# Patient Record
Sex: MALE | Race: WHITE | NOT HISPANIC OR LATINO | Employment: FULL TIME | URBAN - METROPOLITAN AREA
[De-identification: names, ages, dates, MRNs, and addresses within clinical notes are randomized per-mention and may not be internally consistent; named-entity substitution may affect disease eponyms.]

---

## 2020-10-01 ENCOUNTER — TELEPHONE (OUTPATIENT)
Dept: ENDOCRINOLOGY | Facility: CLINIC | Age: 50
End: 2020-10-01

## 2020-10-01 ENCOUNTER — LAB (OUTPATIENT)
Dept: LAB | Facility: HOSPITAL | Age: 50
End: 2020-10-01

## 2020-10-01 ENCOUNTER — OFFICE VISIT (OUTPATIENT)
Dept: ENDOCRINOLOGY | Facility: CLINIC | Age: 50
End: 2020-10-01

## 2020-10-01 VITALS
WEIGHT: 260.8 LBS | BODY MASS INDEX: 35.33 KG/M2 | OXYGEN SATURATION: 98 % | HEART RATE: 90 BPM | HEIGHT: 72 IN | SYSTOLIC BLOOD PRESSURE: 144 MMHG | DIASTOLIC BLOOD PRESSURE: 76 MMHG

## 2020-10-01 DIAGNOSIS — E26.9 HIGH ALDOSTERONE (HCC): ICD-10-CM

## 2020-10-01 DIAGNOSIS — R94.6 ABNORMAL THYROID FUNCTION TEST: Primary | ICD-10-CM

## 2020-10-01 DIAGNOSIS — R79.89 LOW TESTOSTERONE: ICD-10-CM

## 2020-10-01 LAB
T4 SERPL-MCNC: 5.72 MCG/DL (ref 4.5–11.7)
TSH SERPL DL<=0.05 MIU/L-ACNC: 0.78 UIU/ML (ref 0.27–4.2)

## 2020-10-01 PROCEDURE — 84436 ASSAY OF TOTAL THYROXINE: CPT | Performed by: INTERNAL MEDICINE

## 2020-10-01 PROCEDURE — 99204 OFFICE O/P NEW MOD 45 MIN: CPT | Performed by: INTERNAL MEDICINE

## 2020-10-01 PROCEDURE — 84443 ASSAY THYROID STIM HORMONE: CPT | Performed by: INTERNAL MEDICINE

## 2020-10-01 RX ORDER — METOPROLOL SUCCINATE AND HYDROCHLOROTHIAZIDE 12.5; 5 MG/1; MG/1
1 TABLET ORAL DAILY
COMMUNITY
End: 2021-09-15

## 2020-10-01 RX ORDER — TESTOSTERONE CYPIONATE 200 MG/ML
INJECTION, SOLUTION INTRAMUSCULAR
COMMUNITY
Start: 2020-09-12 | End: 2022-05-06

## 2020-10-01 RX ORDER — PRAMIPEXOLE DIHYDROCHLORIDE 0.25 MG/1
0.25 TABLET ORAL 3 TIMES DAILY
COMMUNITY
End: 2022-11-07

## 2020-10-01 RX ORDER — LAMOTRIGINE 150 MG/1
150 TABLET ORAL DAILY
COMMUNITY
End: 2022-07-26

## 2020-10-01 RX ORDER — CAFFEINE 200 MG
200 TABLET ORAL DAILY
COMMUNITY
End: 2021-01-05

## 2020-10-01 RX ORDER — EPLERENONE 25 MG/1
25 TABLET, FILM COATED ORAL DAILY
COMMUNITY
End: 2021-09-10

## 2020-10-01 RX ORDER — POTASSIUM CHLORIDE 1500 MG/1
20 TABLET, FILM COATED, EXTENDED RELEASE ORAL 2 TIMES DAILY
COMMUNITY
End: 2021-09-09 | Stop reason: SDUPTHER

## 2020-10-01 RX ORDER — VIT C/B6/B5/MAGNESIUM/HERB 173 50-5-6-5MG
1 CAPSULE ORAL DAILY
COMMUNITY

## 2020-10-01 RX ORDER — UBIDECARENONE 100 MG
100 CAPSULE ORAL DAILY
COMMUNITY

## 2020-10-01 RX ORDER — AMLODIPINE BESYLATE 10 MG/1
10 TABLET ORAL DAILY
COMMUNITY
End: 2022-11-07

## 2020-10-01 NOTE — TELEPHONE ENCOUNTER
----- Message from Alanna Lopez MD sent at 10/1/2020  9:20 AM EDT -----  Could you please request patient's prior records from [facility], specifically any recent notes/labs (renin/aldosterone levels)?    Thank you

## 2020-10-01 NOTE — PROGRESS NOTES
Chief Complaint   Patient presents with   • Establish Care   • Hypothyroidism        New patient who is being seen in consultation regarding hypothyroidism at the request of Walter Arrington MD     HPI   Damon Blum is a 50 y.o. male who presents for evaluation of hypothyroidism.    Patient presents today for evaluation of hypothyroidism which was diagnosed in June 2020 on routine labs after patient presented to PCP for evaluation of fatigue.  He denies any known history of thyroid dysfunction but is unsure if this is been checked in the past.  He is not currently taking any medication related to thyroid function.     Patient denies palpiltations, anxiety.  Patient denies changes in bowel habits.   Patient denies heat or cold intolerance.  Patient denies recent changes in weight.  Patient does report difficulty losing weight over his adult life but states that weight has been stable over the past 6 months.  Patient denies hair, skin or nail changes.  Patient denies tremor.  Patient reports decreased energy level.    Patient denies history of previous head/neck radiation.  Patient denies history of recent iodine exposure.  Patient denies taking OTC supplements such as biotin.  Patient denies personal or family history of thyroid cancer.  Patient does report that his son was recently diagnosed with hypothyroidism.     Patient also reports desire to discuss potential adrenal abnormality.  Patient reports he has been told in the past that his aldosterone level was elevated and he is currently followed by nephrology for this.  He denies any prior adrenal imaging.  He reports that aldosterone was checked secondary to difficult to control hypertension.  Patient reports that his blood pressure nicol progressively over the past 1 to 2 years.  Blood pressure currently well controlled but requiring multiple agents.  He reports that he was most recently started on eplerenone.  He reports that he tried Aldactone  initially but he developed breast tenderness on this medication.    Patient also reports that he was diagnosed with low testosterone during his evaluation for fatigue and has been started on testosterone 200 mg IM every 2 weeks by his PCP.  He reports symptomatic improvement since starting this.    Past Medical History:   Diagnosis Date   • Arthritis    • Depression    • Hypertension    • Kidney stones      Past Surgical History:   Procedure Laterality Date   • HIP SURGERY Right    • SHOULDER SURGERY Right       Family History   Problem Relation Age of Onset   • Cancer Mother    • Mental illness Mother    • Arthritis Father    • Hypertension Father    • Mental illness Brother    • Arthritis Maternal Grandmother    • Hypertension Maternal Grandmother    • Mental illness Maternal Grandfather    • Arthritis Paternal Grandmother    • Hypertension Paternal Grandmother       Social History     Socioeconomic History   • Marital status:      Spouse name: Not on file   • Number of children: Not on file   • Years of education: Not on file   • Highest education level: Not on file   Tobacco Use   • Smoking status: Never Smoker   • Smokeless tobacco: Never Used   Substance and Sexual Activity   • Alcohol use: Yes     Comment: occa   • Drug use: Never   • Sexual activity: Defer      No Known Allergies   Current Outpatient Medications on File Prior to Visit   Medication Sig Dispense Refill   • amLODIPine (NORVASC) 10 MG tablet Take 10 mg by mouth Daily.     • caffeine 200 MG tablet Take 200 mg by mouth Daily.     • coenzyme Q10 100 MG capsule Take 100 mg by mouth Daily.     • eplerenone (INSPRA) 25 MG tablet Take 25 mg by mouth Daily.     • GLUTAMINE PO Take 1 capsule by mouth Daily.     • lamoTRIgine (LaMICtal) 150 MG tablet Take 150 mg by mouth Daily.     • Metoprolol-HCTZ ER 50-12.5 MG tablet sustained-release 24 hour Take 1 tablet by mouth Daily.     • potassium chloride ER (K-TAB) 20 MEQ tablet controlled-release ER  "tablet Take 20 mEq by mouth 2 (two) times a day.     • pramipexole (Mirapex) 0.25 MG tablet Take 0.25 mg by mouth 3 (Three) Times a Day. Takes 1 tab and 1/2 daily.     • TADALAFIL PO Take 1 tablet by mouth Daily.     • Turmeric 500 MG capsule Take 1 capsule by mouth Daily.     • valsartan 80 MG tablet 320 mg, hydroCHLOROthiazide 25 MG tablet 25 mg Take 1 dose by mouth Daily.     • Testosterone Cypionate (DEPOTESTOTERONE CYPIONATE) 200 MG/ML injection INJECT 1 ML EVERY 2 WEEKS       No current facility-administered medications on file prior to visit.         Review of Systems   Constitutional: Positive for fatigue. Negative for unexpected weight gain and unexpected weight loss.   HENT: Negative for trouble swallowing and voice change.    Eyes: Negative for pain and visual disturbance.   Respiratory: Negative for cough and shortness of breath.    Cardiovascular: Negative for chest pain and palpitations.   Gastrointestinal: Negative for constipation and diarrhea.   Endocrine: Negative for cold intolerance and heat intolerance.   Musculoskeletal: Negative for arthralgias and myalgias.   Skin: Negative for dry skin and rash.   Neurological: Negative for tremors and headache.   Psychiatric/Behavioral: Positive for depressed mood. The patient is nervous/anxious.       Vitals:    10/01/20 0809   BP: 144/76   Pulse: 90   SpO2: 98%   Weight: 118 kg (260 lb 12.8 oz)   Height: 182.9 cm (72\")   Body mass index is 35.37 kg/m².     Physical Exam  Vitals signs reviewed.   Constitutional:       General: He is not in acute distress.     Appearance: Normal appearance.   HENT:      Head: Normocephalic.      Right Ear: Hearing normal.      Left Ear: Hearing normal.      Nose: Nose normal.   Eyes:      General: Lids are normal.      Conjunctiva/sclera: Conjunctivae normal.   Neck:      Thyroid: No thyromegaly or thyroid tenderness.   Cardiovascular:      Rate and Rhythm: Normal rate and regular rhythm.      Heart sounds: No murmur. "   Pulmonary:      Effort: Pulmonary effort is normal.      Breath sounds: Normal breath sounds and air entry.   Abdominal:      General: Bowel sounds are normal.      Palpations: Abdomen is soft.      Tenderness: There is no abdominal tenderness.   Lymphadenopathy:      Head:      Right side of head: No submandibular adenopathy.      Left side of head: No submandibular adenopathy.      Cervical: No cervical adenopathy.   Skin:     General: Skin is warm and dry.      Findings: No rash.   Neurological:      Mental Status: He is alert.      Coordination: Coordination is intact.      Deep Tendon Reflexes: Reflexes are normal and symmetric.   Psychiatric:         Mood and Affect: Mood and affect normal.         Behavior: Behavior is cooperative.        Labs/Imaging  Labs dated 6/24/2020  TSH 5.46    Assessment and Plan    Damon was seen today for establish care and hypothyroidism.    Diagnoses and all orders for this visit:    Abnormal thyroid function test  -Patient with elevated TSH on screening labs in June 2020  -Discussed that abnormality is mild and patient likely has subclinical hypothyroidism, discussed indications for treatment of subclinical hypothyroidism  -We will plan to repeat labs in office today to determine next steps  -Discussed potential treatment with levothyroxine, discussed appropriate administration of medication and side effects  - reviewed symptoms of both hypothyroidism and hyperthyroidism in detail, patient instructed to contact the office between visits with any concerning changes.  -     TSH  -     T4    High aldosterone (CMS/[facility])  -Per patient report, previous records not available  -Request prior records from nephrology and review prior to determining next steps    Low testosterone  -Taking testosterone 200 mg IM every 2 weeks per PCP    Addendum dated 10/9/2020  Received prior nephrology notes which reports at one point patient had aldosterone level of 69 with a concurrent suppressed  renin.  CT of adrenal glands in April 2019 which reported no adenoma or hyperplasia.  Further context is not available. Will request records from PCP.    Addendum dated 10/16   CT abdomen pelvis with contrast dated 9/29/2009  Adrenals are normal.    Labs dated 2/19/2019  Serum aldosterone 60.8, reference range 0-30  Potassium 3.3    Labs dated 2/19/2019  Serum aldosterone 69.9 reference range 0-30  Plasma renin activity less than 0.167  Serum potassium 3.3    Reviewed prior outside labs with patient  Patient desires repeat evaluation for hyperaldosteronism. Discussed this would require stopping eplerenone for several weeks, reviewed risks of worsening hypertension, hypokalemia while off this medication. Patient voiced understanding. Current BP for past week 145 systolic, patient to stop eplerenone. Check BP regularly and call with any concerning changes. BMP in 1-2 weeks to access potassium levels. Repeat renin/daljit after 6 weeks off eplerenone.    Return in about 3 months (around 1/1/2021). The patient was instructed to contact the clinic with any interval questions or concerns.    Alanna Lopez MD     Please note that portions of this document were completed using a voice recognition program. Efforts were made to edit the dictations, but occasionally words are mis-transcribed.

## 2020-10-09 NOTE — TELEPHONE ENCOUNTER
Records reviewed.     Could we request records of any prior aldosterone/renin testing as well as abdominal CT imaging, if available from PCP.

## 2020-10-12 NOTE — TELEPHONE ENCOUNTER
Requested:  Aldosterone/Renin Testing, Abdominal CT Imaging if available.  Fax:  [phone], [facility]:  [phone]

## 2020-10-26 ENCOUNTER — TELEPHONE (OUTPATIENT)
Dept: ENDOCRINOLOGY | Facility: CLINIC | Age: 50
End: 2020-10-26

## 2020-10-26 NOTE — TELEPHONE ENCOUNTER
Patient would like to know if  received and reviewed his previous records for his Adrenal glands.     Dr. Lopez, looks like we received the patient's previous records and I forward them to you.  Please advise.  Thank you,

## 2020-10-26 NOTE — TELEPHONE ENCOUNTER
PT is calling in regards to the Adrenal Gland Study that  conducted on 10/01/2020 He would like for someone to reach out to him in regards to that

## 2020-11-11 ENCOUNTER — TELEPHONE (OUTPATIENT)
Dept: ENDOCRINOLOGY | Facility: CLINIC | Age: 50
End: 2020-11-11

## 2020-11-11 NOTE — TELEPHONE ENCOUNTER
Patient is wanting to know if his blood pressure medication: valsartan will affect his lab results. Please give pt a call.

## 2020-11-11 NOTE — TELEPHONE ENCOUNTER
Patient needs to know if he should come off his HTN meds before having his BMP lab test drawn?  Patient has been off Eplerenone 25 mg for two days now, would like to know if he needs to be off the Valsartan 80 mg as well?    Dr. Lopez, please advise.  Thank you.

## 2020-11-13 ENCOUNTER — TELEPHONE (OUTPATIENT)
Dept: ENDOCRINOLOGY | Facility: CLINIC | Age: 50
End: 2020-11-13

## 2020-11-13 NOTE — TELEPHONE ENCOUNTER
PT CALLED ASKING IF TAKING A FOLIC ACID SUPPLEMENT WOULD INTERFERE W/ RECENT LAB ORDERS WE SENT. PLEASE CALL PT BACK AT CONVENIENCE.

## 2020-12-21 ENCOUNTER — LAB (OUTPATIENT)
Dept: LAB | Facility: HOSPITAL | Age: 50
End: 2020-12-21

## 2020-12-21 ENCOUNTER — LAB (OUTPATIENT)
Dept: ENDOCRINOLOGY | Facility: CLINIC | Age: 50
End: 2020-12-21

## 2020-12-21 DIAGNOSIS — E26.9 HIGH ALDOSTERONE (HCC): ICD-10-CM

## 2020-12-21 LAB
ANION GAP SERPL CALCULATED.3IONS-SCNC: 9.9 MMOL/L (ref 5–15)
BUN SERPL-MCNC: 19 MG/DL (ref 6–20)
BUN/CREAT SERPL: 16.5 (ref 7–25)
CALCIUM SPEC-SCNC: 8.9 MG/DL (ref 8.6–10.5)
CHLORIDE SERPL-SCNC: 107 MMOL/L (ref 98–107)
CO2 SERPL-SCNC: 26.1 MMOL/L (ref 22–29)
CREAT SERPL-MCNC: 1.15 MG/DL (ref 0.76–1.27)
GFR SERPL CREATININE-BSD FRML MDRD: 67 ML/MIN/1.73
GLUCOSE SERPL-MCNC: 89 MG/DL (ref 65–99)
POTASSIUM SERPL-SCNC: 3.4 MMOL/L (ref 3.5–5.2)
SODIUM SERPL-SCNC: 143 MMOL/L (ref 136–145)

## 2020-12-21 PROCEDURE — 80048 BASIC METABOLIC PNL TOTAL CA: CPT

## 2020-12-24 ENCOUNTER — TELEPHONE (OUTPATIENT)
Dept: ENDOCRINOLOGY | Facility: CLINIC | Age: 50
End: 2020-12-24

## 2020-12-24 NOTE — TELEPHONE ENCOUNTER
----- Message from Alanna Lopez MD sent at 12/24/2020 10:23 AM EST -----  Please contact patient and verify his current potassium supplementation. Repeat labs showed slightly low level and we may need to increase. Will provide further instructions once current dose known.

## 2020-12-24 NOTE — TELEPHONE ENCOUNTER
Informed patient of lab results.  Patient states he is taking potassium supplements, 2 meq tabs in the morning and 2 meq tabs at night.

## 2020-12-24 NOTE — TELEPHONE ENCOUNTER
Spoke to patient, he is taking 2 20 mcg tabs daily (total of 80 meq daily). Asked him to increase my 1 tab (20 meq) given low K. Patient voiced understanding.

## 2021-01-05 ENCOUNTER — TELEMEDICINE (OUTPATIENT)
Dept: ENDOCRINOLOGY | Facility: CLINIC | Age: 51
End: 2021-01-05

## 2021-01-05 VITALS — SYSTOLIC BLOOD PRESSURE: 146 MMHG | DIASTOLIC BLOOD PRESSURE: 90 MMHG | WEIGHT: 234 LBS | BODY MASS INDEX: 31.74 KG/M2

## 2021-01-05 DIAGNOSIS — E87.6 HYPOKALEMIA: ICD-10-CM

## 2021-01-05 DIAGNOSIS — E29.1 HYPOGONADISM IN MALE: ICD-10-CM

## 2021-01-05 DIAGNOSIS — R94.6 ABNORMAL THYROID FUNCTION TEST: ICD-10-CM

## 2021-01-05 DIAGNOSIS — E26.9 HIGH ALDOSTERONE (HCC): Primary | ICD-10-CM

## 2021-01-05 PROCEDURE — 99214 OFFICE O/P EST MOD 30 MIN: CPT | Performed by: INTERNAL MEDICINE

## 2021-01-05 NOTE — PROGRESS NOTES
Chief Complaint   Patient presents with   • Follow-up   • Abnormal Lab     high aldosterone   • Hypothyroidism        HPI   Lisseth Burrell is a 50 y.o. male had concerns including Follow-up, Abnormal Lab (high aldosterone), and Hypothyroidism.      This was an audio and video enabled telemedicine encounter. A total of 18 minutes was spent in direct contact with the patient.    Patient reports doing generally well in the interim since his last visit.  He has now been off eplerenone for approximately 8 weeks.  He is monitoring blood pressure periodically and reports, systolics generally 140s to 150s.  He denies any headaches or vision changes.  Potassium supplementation increased to total of 100 mEq daily given hypokalemia on recent labs.    Reviewed recent thyroid function testing, discussed no intervention required at this time.    Patient continues on testosterone per PCP.    The following portions of the patient's history were reviewed and updated as appropriate: allergies, current medications and past social history.    Review of Systems   Respiratory: Negative for cough and shortness of breath.    Cardiovascular: Negative for chest pain and palpitations.   Gastrointestinal: Negative for constipation and diarrhea.   Neurological: Negative for headache.          /90   Wt 106 kg (234 lb)   BMI 31.74 kg/m²      Physical Exam    General: well appearing, in no acute distress  Respiratory: no increased work of breathing  Neuro: alert, answers questions appropriately  Psych: appropriate mood and affect    Labs/Imaging  Results for LISSETH BURRELL (MRN [id number]) as of 1/5/2021 18:13   Ref. Range 10/1/2020 08:53 12/21/2020 08:31   Glucose Latest Ref Range: 65 - 99 mg/dL  89   Sodium Latest Ref Range: 136 - 145 mmol/L  143   Potassium Latest Ref Range: 3.5 - 5.2 mmol/L  3.4 (L)   CO2 Latest Ref Range: 22.0 - 29.0 mmol/L  26.1   Chloride Latest Ref Range: 98 - 107 mmol/L  107   Anion Gap Latest Ref  Range: 5.0 - 15.0 mmol/L  9.9   Creatinine Latest Ref Range: 0.76 - 1.27 mg/dL  1.15   BUN Latest Ref Range: 6 - 20 mg/dL  19   BUN/Creatinine Ratio Latest Ref Range: 7.0 - 25.0   16.5   Calcium Latest Ref Range: 8.6 - 10.5 mg/dL  8.9   eGFR Non  Am Latest Ref Range: >60 mL/min/1.73  67   TSH Baseline Latest Ref Range: 0.270 - 4.200 uIU/mL 0.783    T4, Total Latest Ref Range: 4.50 - 11.70 mcg/dL 5.72        Diagnoses and all orders for this visit:    1. High aldosterone (CMS/HCC) (Primary)  -Patient now off of eplerenone for approximately 8 weeks, will plan to repeat aldosterone renin ratio, renal function panel  -Discussed if ratio elevated will proceed with CT adrenal protocol  -     Aldosterone / Renin Ratio; Future  -     Renal Function Panel; Future    2. Hypokalemia  -Likely secondary to elevated aldosterone  -Currently taking potassium, total of 100 mEq daily  -Repeat BMP ordered    3. Abnormal thyroid function test  Patient with abnormal TSH on labs in June 2020, resolved on repeat testing  Patient clinically euthyroid  Plan to repeat TSH in 6 to 12 months, sooner if concerning symptoms develop    4. Hypogonadism in male  -Patient on testosterone from PCP      Return in about 3 months (around 4/5/2021). The patient was instructed to contact the clinic with any interval questions or concerns.    Alanna Lopez MD   Endocrinologist    Please note that portions of this document were completed with a voice recognition program. Efforts were made to edit the dictations, but occasionally words are mis-transcribed.

## 2021-02-04 ENCOUNTER — TELEPHONE (OUTPATIENT)
Dept: ENDOCRINOLOGY | Facility: CLINIC | Age: 51
End: 2021-02-04

## 2021-02-04 NOTE — TELEPHONE ENCOUNTER
Requested recent labs for patient's from PCP's office.  Fax request to [phone].    Walter Morgan.  Phone:  [phone]

## 2021-02-04 NOTE — TELEPHONE ENCOUNTER
Patient called and said he had the active labs done 3 weeks ago and wanted to know if we could reach out to his PCP to get those medical records since we don't have them yet. He would also like a call back from Dr. Lopez regarding the labs.

## 2021-02-08 NOTE — TELEPHONE ENCOUNTER
Please contact patient, aldosterone/renin ratio is elevated. I would recommend proceeding with repeat CT of the adrenals, please let me know if he is agreeable and I will order.

## 2021-02-11 DIAGNOSIS — D35.02 ADRENAL CORTICAL ADENOMA OF LEFT ADRENAL GLAND: ICD-10-CM

## 2021-02-11 DIAGNOSIS — E26.9 HIGH ALDOSTERONE (HCC): Primary | ICD-10-CM

## 2021-02-26 ENCOUNTER — HOSPITAL ENCOUNTER (OUTPATIENT)
Dept: CT IMAGING | Facility: HOSPITAL | Age: 51
Discharge: HOME | End: 2021-02-26
Admitting: INTERNAL MEDICINE

## 2021-02-26 DIAGNOSIS — E26.9 HIGH ALDOSTERONE (HCC): ICD-10-CM

## 2021-02-26 LAB — CREAT BLDA-MCNC: 1.4 MG/DL (ref 0.6–1.3)

## 2021-02-26 PROCEDURE — 0 IOPAMIDOL PER 1 ML: Performed by: INTERNAL MEDICINE

## 2021-02-26 PROCEDURE — 82565 ASSAY OF CREATININE: CPT

## 2021-02-26 PROCEDURE — 74170 CT ABD WO CNTRST FLWD CNTRST: CPT

## 2021-02-26 RX ADMIN — IOPAMIDOL 85 ML: 755 INJECTION, SOLUTION INTRAVENOUS at 16:17

## 2021-03-10 DIAGNOSIS — I10 ESSENTIAL HYPERTENSION, BENIGN: Primary | ICD-10-CM

## 2021-03-10 RX ORDER — PRAMIPEXOLE DIHYDROCHLORIDE 0.25 MG/1
0.25 TABLET ORAL 2 TIMES DAILY
Qty: 180 TABLET | Refills: 3 | OUTPATIENT
Start: 2021-03-10

## 2021-03-10 RX ORDER — AMLODIPINE BESYLATE 10 MG/1
10 TABLET ORAL DAILY
Qty: 90 TABLET | Refills: 0 | Status: SHIPPED | OUTPATIENT
Start: 2021-03-10 | End: 2022-11-07

## 2021-03-10 RX ORDER — METOPROLOL SUCCINATE 50 MG/1
50 TABLET, EXTENDED RELEASE ORAL DAILY
Qty: 90 TABLET | Refills: 0 | OUTPATIENT
Start: 2021-03-10 | End: 2021-09-15

## 2021-03-10 RX ORDER — POTASSIUM CHLORIDE 20 MEQ/1
20 TABLET, EXTENDED RELEASE ORAL DAILY
Qty: 90 TABLET | Refills: 0 | Status: SHIPPED | OUTPATIENT
Start: 2021-03-10 | End: 2021-09-30 | Stop reason: SDUPTHER

## 2021-03-10 RX ORDER — VALSARTAN 320 MG/1
320 TABLET ORAL DAILY
Qty: 90 TABLET | Refills: 0 | Status: SHIPPED | OUTPATIENT
Start: 2021-03-10 | End: 2022-11-07

## 2021-03-11 RX ORDER — DEXAMETHASONE 1 MG
1 TABLET ORAL ONCE
Qty: 1 TABLET | Refills: 0 | Status: SHIPPED | OUTPATIENT
Start: 2021-03-11 | End: 2021-03-11

## 2021-03-12 ENCOUNTER — TELEPHONE (OUTPATIENT)
Dept: ENDOCRINOLOGY | Facility: CLINIC | Age: 51
End: 2021-03-12

## 2021-03-12 NOTE — TELEPHONE ENCOUNTER
----- Message from Alanna Lopez MD sent at 3/11/2021  3:40 PM EST -----  See result letter, please contact patient.

## 2021-03-15 ENCOUNTER — LAB (OUTPATIENT)
Dept: LAB | Facility: HOSPITAL | Age: 51
End: 2021-03-15

## 2021-03-15 ENCOUNTER — LAB (OUTPATIENT)
Dept: ENDOCRINOLOGY | Facility: CLINIC | Age: 51
End: 2021-03-15

## 2021-03-15 DIAGNOSIS — E26.9 HIGH ALDOSTERONE (HCC): ICD-10-CM

## 2021-03-15 DIAGNOSIS — D35.02 ADRENAL CORTICAL ADENOMA OF LEFT ADRENAL GLAND: ICD-10-CM

## 2021-03-15 LAB
ALBUMIN SERPL-MCNC: 4.4 G/DL (ref 3.5–5.2)
ANION GAP SERPL CALCULATED.3IONS-SCNC: 9.7 MMOL/L (ref 5–15)
BUN SERPL-MCNC: 15 MG/DL (ref 6–20)
BUN/CREAT SERPL: 14.3 (ref 7–25)
CALCIUM SPEC-SCNC: 8.8 MG/DL (ref 8.6–10.5)
CHLORIDE SERPL-SCNC: 108 MMOL/L (ref 98–107)
CO2 SERPL-SCNC: 26.3 MMOL/L (ref 22–29)
CORTIS AM PEAK SERPL-MCNC: 0.49 MCG/DL
CREAT SERPL-MCNC: 1.05 MG/DL (ref 0.76–1.27)
GFR SERPL CREATININE-BSD FRML MDRD: 74 ML/MIN/1.73
GLUCOSE SERPL-MCNC: 101 MG/DL (ref 65–99)
PHOSPHATE SERPL-MCNC: 3.2 MG/DL (ref 2.5–4.5)
POTASSIUM SERPL-SCNC: 3.5 MMOL/L (ref 3.5–5.2)
SODIUM SERPL-SCNC: 144 MMOL/L (ref 136–145)

## 2021-03-15 PROCEDURE — 83835 ASSAY OF METANEPHRINES: CPT

## 2021-03-15 PROCEDURE — 80069 RENAL FUNCTION PANEL: CPT

## 2021-03-15 PROCEDURE — 36415 COLL VENOUS BLD VENIPUNCTURE: CPT

## 2021-03-15 PROCEDURE — 82088 ASSAY OF ALDOSTERONE: CPT

## 2021-03-15 PROCEDURE — 82533 TOTAL CORTISOL: CPT

## 2021-03-15 PROCEDURE — 84244 ASSAY OF RENIN: CPT

## 2021-03-24 LAB
METANEPH FREE SERPL-MCNC: 19.9 PG/ML (ref 0–88)
NORMETANEPHRINE SERPL-MCNC: 103.7 PG/ML (ref 0–136.8)

## 2021-03-25 LAB
ALDOST SERPL-MCNC: 44.9 NG/DL (ref 0–30)
ALDOST/RENIN PLAS-RTO: >268.9 {RATIO} (ref 0–30)
RENIN PLAS-CCNC: <0.167 NG/ML/HR (ref 0.17–5.38)

## 2021-04-05 ENCOUNTER — OFFICE VISIT (OUTPATIENT)
Dept: ENDOCRINOLOGY | Facility: CLINIC | Age: 51
End: 2021-04-05

## 2021-04-05 VITALS
OXYGEN SATURATION: 98 % | DIASTOLIC BLOOD PRESSURE: 74 MMHG | HEIGHT: 72 IN | HEART RATE: 81 BPM | WEIGHT: 248.8 LBS | SYSTOLIC BLOOD PRESSURE: 152 MMHG | BODY MASS INDEX: 33.7 KG/M2

## 2021-04-05 DIAGNOSIS — D35.02 ADRENAL CORTICAL ADENOMA OF LEFT ADRENAL GLAND: ICD-10-CM

## 2021-04-05 DIAGNOSIS — E26.09 PRIMARY HYPERALDOSTERONISM (HCC): Primary | ICD-10-CM

## 2021-04-05 PROCEDURE — 99214 OFFICE O/P EST MOD 30 MIN: CPT | Performed by: INTERNAL MEDICINE

## 2021-04-05 RX ORDER — ERGOCALCIFEROL 1.25 MG/1
1 CAPSULE ORAL WEEKLY
COMMUNITY
Start: 2021-03-23 | End: 2022-11-07

## 2021-04-05 RX ORDER — PROPRANOLOL HYDROCHLORIDE 10 MG/1
1 TABLET ORAL 2 TIMES DAILY
COMMUNITY
Start: 2021-02-11 | End: 2022-11-07

## 2021-04-05 NOTE — PROGRESS NOTES
"Chief Complaint   Patient presents with   • Follow-up   • Adrenal Problem     Hyperaldosteronism, adrenal adenoma        HPI   Damon Blum is a 50 y.o. male had concerns including Follow-up and Adrenal Problem (Hyperaldosteronism, adrenal adenoma).      Patient reports that he has been seen by neurology and diagnosed with seizures in the interim since last visit.    Regarding hyperaldosteronism, patient remains off of eplerenone.  He is monitoring blood pressure regularly at home and reports systolic generally in the 140s.  He continues on potassium replacement, currently taking 100 mEq daily.    Reviewed CT scan results with small adrenal adenoma.  Discussed hormonal testing including dexamethasone suppression, plasma metanephrines which were within normal range.    The following portions of the patient's history were reviewed and updated as appropriate: allergies, current medications, past family history, past medical history, past social history, past surgical history and problem list.    Review of Systems   Eyes: Negative for visual disturbance.   Cardiovascular: Negative for palpitations.   Gastrointestinal: Negative for constipation and diarrhea.   Neurological: Negative for headache.        /74   Pulse 81   Ht 182.9 cm (72\")   Wt 113 kg (248 lb 12.8 oz)   SpO2 98%   BMI 33.74 kg/m²      Physical Exam      Constitutional:  well developed; well nourished  no acute distress   ENT/Thyroid: no thyromegaly  no palpable nodules   Eyes: EOM intact  Conjunctiva: clear   Respiratory:  breathing is unlabored  clear to auscultation bilaterally   Cardiovascular:  regular rate and rhythm   Chest:  Not performed.   Abdomen: soft, non-tender; no masses   : Not performed.   Musculoskeletal: Not performed   Skin: dry and warm   Neuro: normal without focal findings and mental status, speech normal   Psych: oriented to time, place and person, mood and affect are within normal limits       Labs/Imaging  Labs " dated 2/19/2019  Serum aldosterone 69.9 reference range 0-30  Plasma renin activity less than 0.167  Serum potassium 3.3    Labs dated 1/11/2021  Aldosterone to renin ratio greater than 61.7  Aldosterone 10.3  Plasma renin activity less than 0.167  Potassium 3.6    EXAMINATION: CT ABDOMEN WWO CONTRAST - 02/26/2021     INDICATION: E26.9-Hyperaldosteronism, unspecified. Elevated aldosterone.     TECHNIQUE: Multiple axial CT imaging is obtained of the abdomen pre- and  post administration of intravenous contrast.     The radiation dose reduction device was turned on for each scan per the  ALARA (As Low as Reasonably Achievable) protocol.     COMPARISON: None.     FINDINGS: Lung bases are grossly clear. The liver is homogeneous in  appearance. Spleen is unremarkable. Pancreas is homogeneous. There is  question of a tiny nodule identified on the left adrenal gland measuring  approximately 1.5 cm. The nodule appears to lie between the 2 limbs of  the adrenal gland. Continued follow-up is recommended of this area.  Hounsfield units are extremely hard for accuracy given the small size of  the nodule. There are tiny nonobstructing stones seen in the kidneys  bilaterally. No abdominal or retroperitoneal lymphadenopathy. The  abdominal portion of the gastrointestinal tract is within normal limits.  The appendix is normal. No free fluid or free air. No abnormal mass or  fluid collections identified. Degenerative changes seen within the spine  and pelvis.     IMPRESSION:  Tiny 1.5 cm nodule between the 2 limbs of the left adrenal  gland. The Hounsfield units are extremely difficult to measure given the  small size. 3-6 month follow-up is recommended as indicated.     DICTATED:   02/26/2021  EDITED/ls :   02/26/2021     This report was finalized on 3/1/2021 4:10 PM by Dr. Leatha Linder MD.    Results for LISSETH BURRELL (MRN [id number]) as of 4/5/2021 08:31   Ref. Range 3/15/2021 08:22   Glucose Latest Ref Range: 65 -  99 mg/dL 101 (H)   Sodium Latest Ref Range: 136 - 145 mmol/L 144   Potassium Latest Ref Range: 3.5 - 5.2 mmol/L 3.5   CO2 Latest Ref Range: 22.0 - 29.0 mmol/L 26.3   Chloride Latest Ref Range: 98 - 107 mmol/L 108 (H)   Anion Gap Latest Ref Range: 5.0 - 15.0 mmol/L 9.7   Creatinine Latest Ref Range: 0.76 - 1.27 mg/dL 1.05   BUN Latest Ref Range: 6 - 20 mg/dL 15   BUN/Creatinine Ratio Latest Ref Range: 7.0 - 25.0  14.3   Calcium Latest Ref Range: 8.6 - 10.5 mg/dL 8.8   eGFR Non  Am Latest Ref Range: >60 mL/min/1.73 74   Albumin Latest Ref Range: 3.50 - 5.20 g/dL 4.40   Phosphorus Latest Ref Range: 2.5 - 4.5 mg/dL 3.2   Aldosterone Latest Ref Range: 0.0 - 30.0 ng/dL 44.9 (H)   Cortisol - AM Latest Units: mcg/dL 0.49   Renin Activity Latest Ref Range: 0.167 - 5.380 ng/mL/hr <0.167 (L)   Aldosterone/Renin Ratio Latest Ref Range: 0.0 - 30.0  >268.9 (H)   Metanephrine Latest Ref Range: 0.0 - 88.0 pg/mL 19.9   Normetanephrine Latest Ref Range: 0.0 - 136.8 pg/mL 103.7       Diagnoses and all orders for this visit:    1. Primary hyperaldosteronism (CMS/HCC) (Primary)  -Elevated aldosterone level (69.9) initially noted in 2019, however, context surrounding this testing unclear  -Patient underwent repeat testing after he was taken off eplerenone: He did continue on valsartan.  --In January 2021 aldosterone to renin ratio was greater than 61.7  --Testing in March 2021 with aldosterone 44.9, suppressed renin (<0.167), ratio >268.9  -Discussed that given spontaneous hypokalemia, suppressed renin, and aldosterone >20 further confirmatory testing is not required  -CT abdomen adrenal protocol in February 2020 with 1.5 cm left adrenal nodule  -Patient does report that he desires surgery for correction of  Hyperaldosteronism, if indicated.  Reviewed that per guidelines, given his age, AVS is recommended prior to proceeding with surgery. Discussed with patient that AVS is not available locally, he is agreeable to referral to a  [facility] for evaluation.  -Patient will continue current blood pressure regimen and potassium supplementation.    2. Adrenal cortical adenoma of left adrenal gland  -1.5 cm, noted during evaluation for hyperaldosteronism  -Patient underwent testing of plasma metanephrines as well as dexamethasone suppression testing (AM cortisol 0.49) in March 2021 which was normal    Addendum dated 7/12  Received consultation from [facility]: Planning to proceed with 24-hour urine aldosterone on high salt diet followed by AVS.  Return in about 3 months (around 7/5/2021) for Next scheduled follow up. The patient was instructed to contact the clinic with any interval questions or concerns.    Alanna Lopez MD   Endocrinologist    Please note that portions of this document were completed with a voice recognition program. Efforts were made to edit the dictations, but occasionally words are mis-transcribed.

## 2021-04-14 DIAGNOSIS — E26.09 PRIMARY HYPERALDOSTERONISM (HCC): Primary | ICD-10-CM

## 2021-04-14 DIAGNOSIS — D35.02 ADRENAL CORTICAL ADENOMA OF LEFT ADRENAL GLAND: ICD-10-CM

## 2021-07-09 ENCOUNTER — TELEPHONE (OUTPATIENT)
Dept: ENDOCRINOLOGY | Facility: CLINIC | Age: 51
End: 2021-07-09

## 2021-07-09 NOTE — TELEPHONE ENCOUNTER
AUGUSTO AMBROSE FROM [place] CALLED IN REGARDS TO NEEDING INFORMATION ON LISSETH BURRELL'S MOST RECENT CAT SCAN AS WELL AS THE ONE IN 2019. CALLBACK -[phone]                      .

## 2021-09-09 NOTE — TELEPHONE ENCOUNTER
Called and spoke with pt he would like a refill on Potassium Tabs. He states he was taking 2 po BID and he saw Dr Obregon at [facility] and it was changed to 3 po bid. He would like to have the coated ones. The one he is currently taking dissolves in his mouth to fast. He would like to know if this can be filled at [facility] in [place].

## 2021-09-10 DIAGNOSIS — E87.6 HYPOKALEMIA: ICD-10-CM

## 2021-09-10 DIAGNOSIS — E26.09 PRIMARY HYPERALDOSTERONISM (HCC): Primary | ICD-10-CM

## 2021-09-10 RX ORDER — POTASSIUM CHLORIDE 1500 MG/1
TABLET, FILM COATED, EXTENDED RELEASE ORAL
Qty: 180 TABLET | Refills: 0 | Status: SHIPPED | OUTPATIENT
Start: 2021-09-10 | End: 2021-10-07

## 2021-09-10 NOTE — TELEPHONE ENCOUNTER
I do not have recent labs for patient and previously his PCP wrote this prescription. I will send 90 days but he needs BMP which I will place an order for.

## 2021-09-10 NOTE — TELEPHONE ENCOUNTER
Pt called back checking on the status on his Potassium chloride 20 meq tablet. Pt last seen 04/05/21 no followup appt scheduled

## 2021-09-15 ENCOUNTER — APPOINTMENT (OUTPATIENT)
Dept: GENERAL RADIOLOGY | Facility: HOSPITAL | Age: 51
End: 2021-09-15

## 2021-09-15 ENCOUNTER — HOSPITAL ENCOUNTER (EMERGENCY)
Facility: HOSPITAL | Age: 51
Discharge: HOME | End: 2021-09-15
Attending: EMERGENCY MEDICINE | Admitting: EMERGENCY MEDICINE

## 2021-09-15 VITALS
BODY MASS INDEX: 31.83 KG/M2 | WEIGHT: 235 LBS | OXYGEN SATURATION: 96 % | SYSTOLIC BLOOD PRESSURE: 148 MMHG | RESPIRATION RATE: 18 BRPM | HEIGHT: 72 IN | TEMPERATURE: 98 F | HEART RATE: 76 BPM | DIASTOLIC BLOOD PRESSURE: 97 MMHG

## 2021-09-15 DIAGNOSIS — R07.89 ATYPICAL CHEST PAIN: ICD-10-CM

## 2021-09-15 DIAGNOSIS — I49.3 PVC'S (PREMATURE VENTRICULAR CONTRACTIONS): Primary | ICD-10-CM

## 2021-09-15 PROBLEM — I10 ESSENTIAL HYPERTENSION: Status: ACTIVE | Noted: 2021-09-15

## 2021-09-15 PROBLEM — R07.9 CHEST PAIN: Status: ACTIVE | Noted: 2021-09-15

## 2021-09-15 LAB
ALBUMIN SERPL-MCNC: 4 G/DL (ref 3.5–5.2)
ALBUMIN/GLOB SERPL: 1.7 G/DL
ALP SERPL-CCNC: 80 U/L (ref 39–117)
ALT SERPL W P-5'-P-CCNC: 20 U/L (ref 1–41)
ANION GAP SERPL CALCULATED.3IONS-SCNC: 8 MMOL/L (ref 5–15)
AST SERPL-CCNC: 17 U/L (ref 1–40)
BASOPHILS # BLD AUTO: 0.03 10*3/MM3 (ref 0–0.2)
BASOPHILS NFR BLD AUTO: 0.5 % (ref 0–1.5)
BILIRUB SERPL-MCNC: 0.4 MG/DL (ref 0–1.2)
BUN SERPL-MCNC: 19 MG/DL (ref 6–20)
BUN/CREAT SERPL: 14.7 (ref 7–25)
CALCIUM SPEC-SCNC: 9.3 MG/DL (ref 8.6–10.5)
CHLORIDE SERPL-SCNC: 106 MMOL/L (ref 98–107)
CO2 SERPL-SCNC: 26 MMOL/L (ref 22–29)
CREAT SERPL-MCNC: 1.29 MG/DL (ref 0.76–1.27)
D DIMER PPP FEU-MCNC: 0.31 MCGFEU/ML (ref 0–0.56)
DEPRECATED RDW RBC AUTO: 39.9 FL (ref 37–54)
EOSINOPHIL # BLD AUTO: 0.29 10*3/MM3 (ref 0–0.4)
EOSINOPHIL NFR BLD AUTO: 5 % (ref 0.3–6.2)
ERYTHROCYTE [DISTWIDTH] IN BLOOD BY AUTOMATED COUNT: 12.8 % (ref 12.3–15.4)
GFR SERPL CREATININE-BSD FRML MDRD: 58 ML/MIN/1.73
GLOBULIN UR ELPH-MCNC: 2.4 GM/DL
GLUCOSE SERPL-MCNC: 126 MG/DL (ref 65–99)
HCT VFR BLD AUTO: 44.7 % (ref 37.5–51)
HGB BLD-MCNC: 15.2 G/DL (ref 13–17.7)
HOLD SPECIMEN: NORMAL
IMM GRANULOCYTES # BLD AUTO: 0.01 10*3/MM3 (ref 0–0.05)
IMM GRANULOCYTES NFR BLD AUTO: 0.2 % (ref 0–0.5)
LIPASE SERPL-CCNC: 53 U/L (ref 13–60)
LYMPHOCYTES # BLD AUTO: 1.22 10*3/MM3 (ref 0.7–3.1)
LYMPHOCYTES NFR BLD AUTO: 21 % (ref 19.6–45.3)
MCH RBC QN AUTO: 29.6 PG (ref 26.6–33)
MCHC RBC AUTO-ENTMCNC: 34 G/DL (ref 31.5–35.7)
MCV RBC AUTO: 87 FL (ref 79–97)
MONOCYTES # BLD AUTO: 0.47 10*3/MM3 (ref 0.1–0.9)
MONOCYTES NFR BLD AUTO: 8.1 % (ref 5–12)
NEUTROPHILS NFR BLD AUTO: 3.79 10*3/MM3 (ref 1.7–7)
NEUTROPHILS NFR BLD AUTO: 65.2 % (ref 42.7–76)
NRBC BLD AUTO-RTO: 0 /100 WBC (ref 0–0.2)
NT-PROBNP SERPL-MCNC: <5 PG/ML (ref 0–900)
PLATELET # BLD AUTO: 161 10*3/MM3 (ref 140–450)
PMV BLD AUTO: 10.2 FL (ref 6–12)
POTASSIUM SERPL-SCNC: 3.7 MMOL/L (ref 3.5–5.2)
PROT SERPL-MCNC: 6.4 G/DL (ref 6–8.5)
QT INTERVAL: 354 MS
QT INTERVAL: 366 MS
QTC INTERVAL: 395 MS
QTC INTERVAL: 414 MS
RBC # BLD AUTO: 5.14 10*6/MM3 (ref 4.14–5.8)
SODIUM SERPL-SCNC: 140 MMOL/L (ref 136–145)
TROPONIN T SERPL-MCNC: <0.01 NG/ML (ref 0–0.03)
TROPONIN T SERPL-MCNC: <0.01 NG/ML (ref 0–0.03)
WBC # BLD AUTO: 5.81 10*3/MM3 (ref 3.4–10.8)
WHOLE BLOOD HOLD SPECIMEN: NORMAL
WHOLE BLOOD HOLD SPECIMEN: NORMAL

## 2021-09-15 PROCEDURE — 80053 COMPREHEN METABOLIC PANEL: CPT | Performed by: EMERGENCY MEDICINE

## 2021-09-15 PROCEDURE — 83690 ASSAY OF LIPASE: CPT | Performed by: EMERGENCY MEDICINE

## 2021-09-15 PROCEDURE — 85025 COMPLETE CBC W/AUTO DIFF WBC: CPT | Performed by: EMERGENCY MEDICINE

## 2021-09-15 PROCEDURE — 84484 ASSAY OF TROPONIN QUANT: CPT | Performed by: EMERGENCY MEDICINE

## 2021-09-15 PROCEDURE — 99284 EMERGENCY DEPT VISIT MOD MDM: CPT

## 2021-09-15 PROCEDURE — 85379 FIBRIN DEGRADATION QUANT: CPT | Performed by: EMERGENCY MEDICINE

## 2021-09-15 PROCEDURE — 93005 ELECTROCARDIOGRAM TRACING: CPT | Performed by: EMERGENCY MEDICINE

## 2021-09-15 PROCEDURE — 83880 ASSAY OF NATRIURETIC PEPTIDE: CPT | Performed by: EMERGENCY MEDICINE

## 2021-09-15 PROCEDURE — 71045 X-RAY EXAM CHEST 1 VIEW: CPT

## 2021-09-15 RX ORDER — VILAZODONE HYDROCHLORIDE 20 MG/1
20 TABLET ORAL DAILY
COMMUNITY
End: 2022-11-07

## 2021-09-15 RX ORDER — CETIRIZINE HYDROCHLORIDE 10 MG/1
10 TABLET ORAL DAILY
COMMUNITY

## 2021-09-15 RX ORDER — LEVOMEFOLATE CALCIUM 15 MG
15 TABLET ORAL DAILY
COMMUNITY
End: 2022-11-07

## 2021-09-15 RX ORDER — ASPIRIN 81 MG/1
324 TABLET, CHEWABLE ORAL ONCE
Status: COMPLETED | OUTPATIENT
Start: 2021-09-15 | End: 2021-09-15

## 2021-09-15 RX ORDER — METOPROLOL SUCCINATE 50 MG/1
50 TABLET, EXTENDED RELEASE ORAL DAILY
Qty: 90 TABLET | Refills: 0 | Status: SHIPPED | OUTPATIENT
Start: 2021-09-15 | End: 2022-06-20

## 2021-09-15 RX ORDER — SODIUM CHLORIDE 0.9 % (FLUSH) 0.9 %
10 SYRINGE (ML) INJECTION AS NEEDED
Status: DISCONTINUED | OUTPATIENT
Start: 2021-09-15 | End: 2021-09-15 | Stop reason: HOSPADM

## 2021-09-15 RX ADMIN — ASPIRIN 324 MG: 81 TABLET, CHEWABLE ORAL at 11:31

## 2021-09-15 NOTE — CONSULTS
[facility] Consult Note      Patient Care Team:  Walter Arrington MD as PCP - General (Family Medicine)  Walter Arrington MD  No ref. provider found    Subjective     History of Present Illness: Mr. Blum is a pleasant 51-year-old male with hypertension.  He also has an apparent adrenal gland tumor that is being followed at [facility].  He presented to [facility] ED today with palpitations that have been occurring for the past several weeks.  He says that it feels like his heart is stopping and then restarting.  Telemetry in the ED today reveals frequent PVCs.  He says that his psychiatrist changed his metoprolol to Inderal due to anxiety.  He is also been having some chest tightness which he describes as a gripping feeling that occurs randomly.  He says he has no associated shortness of breath, dyspnea on exertion, or diaphoresis.  He is currently resting comfortably without complaint.      Current Facility-Administered Medications:   •  sodium chloride 0.9 % flush 10 mL, 10 mL, Intravenous, PRN, Ernie Smith MD    Current Outpatient Medications:   •  amLODIPine (NORVASC) 10 MG tablet, Take 10 mg by mouth Daily., Disp: , Rfl:   •  cetirizine (zyrTEC) 10 MG tablet, Take 10 mg by mouth Daily., Disp: , Rfl:   •  coenzyme Q10 100 MG capsule, Take 100 mg by mouth Daily., Disp: , Rfl:   •  GLUTAMINE PO, Take 1 capsule by mouth Daily., Disp: , Rfl:   •  l-methylfolate 15 MG tablet tablet, Take 15 mg by mouth Daily., Disp: , Rfl:   •  lamoTRIgine (LaMICtal) 150 MG tablet, Take 150 mg by mouth Daily., Disp: , Rfl:   •  potassium chloride (K-DUR,KLOR-CON) 20 MEQ CR tablet, Take 1 tablet by mouth Daily., Disp: 90 tablet, Rfl: 0  •  pramipexole (Mirapex) 0.25 MG tablet, Take 0.25 mg by mouth 3 (Three) Times a Day. Takes 1 tab and 1/2 daily., Disp: , Rfl:   •  propranolol (INDERAL) 10 MG tablet, Take 1 tablet by mouth 2 (two) times a  day., Disp: , Rfl:   •  TADALAFIL PO, Take 1 tablet by mouth Daily., Disp: , Rfl:   •  Testosterone Cypionate (DEPOTESTOTERONE CYPIONATE) 200 MG/ML injection, INJECT 1 ML EVERY 2 WEEKS, Disp: , Rfl:   •  Turmeric 500 MG capsule, Take 1 capsule by mouth Daily., Disp: , Rfl:   •  valsartan (DIOVAN) 320 MG tablet, Take 1 tablet by mouth Daily., Disp: 90 tablet, Rfl: 0  •  vilazodone (VIIBRYD) 20 MG tablet tablet, Take 20 mg by mouth Daily., Disp: , Rfl:   •  vitamin D (ERGOCALCIFEROL) 1.25 MG (41476 UT) capsule capsule, Take 1 capsule by mouth 1 (One) Time Per Week., Disp: , Rfl:   •  amLODIPine (NORVASC) 10 MG tablet, Take 1 tablet by mouth Daily., Disp: 90 tablet, Rfl: 0  •  metoprolol succinate XL (TOPROL-XL) 50 MG 24 hr tablet, Take 1 tablet by mouth Daily., Disp: 90 tablet, Rfl: 0  •  potassium chloride ER (K-TAB) 20 MEQ tablet controlled-release ER tablet, 3 po bid, needs coated tablets, Disp: 180 tablet, Rfl: 0  •  valsartan 80 MG tablet 320 mg, hydroCHLOROthiazide 25 MG tablet 25 mg, Take 1 dose by mouth Daily., Disp: , Rfl:     Social History     Socioeconomic History   • Marital status:      Spouse name: Not on file   • Number of children: Not on file   • Years of education: Not on file   • Highest education level: Not on file   Tobacco Use   • Smoking status: Never Smoker   • Smokeless tobacco: Never Used   Vaping Use   • Vaping Use: Never used   Substance and Sexual Activity   • Alcohol use: Yes     Comment: occa   • Drug use: Never   • Sexual activity: Defer       Family History   Problem Relation Age of Onset   • Cancer Mother    • Mental illness Mother    • Arthritis Father    • Hypertension Father    • Mental illness Brother    • Arthritis Maternal Grandmother    • Hypertension Maternal Grandmother    • Mental illness Maternal Grandfather    • Arthritis Paternal Grandmother    • Hypertension Paternal Grandmother        Review of Systems   Constitutional: Negative.    HENT: Negative.    Eyes:  Negative.    Respiratory: Positive for chest tightness.    Cardiovascular: Positive for chest pain and palpitations.   Gastrointestinal: Negative.    Endocrine: Negative.    Genitourinary: Negative.    Musculoskeletal: Negative.    Skin: Negative.    Allergic/Immunologic: Negative.    Neurological: Negative.    Hematological: Negative.    Psychiatric/Behavioral: Negative.           Objective     Vital Signs  Temp:  [98 °F (36.7 °C)] 98 °F (36.7 °C)  Heart Rate:  [74-79] 76  Resp:  [18-20] 18  BP: (132-151)/() 148/97    No intake or output data in the 24 hours ending 09/15/21 1224  No intake/output data recorded.    Constitutional:       Appearance: Not in distress.   Eyes:      Conjunctiva/sclera: Conjunctivae normal.      Pupils: Pupils are equal, round, and reactive to light.   HENT:      Nose: Nose normal.    Mouth/Throat:      Pharynx: Oropharynx is clear.   Neck:      Thyroid: Thyroid normal. No thyromegaly.      Vascular: JVD normal.      Lymphadenopathy: No cervical adenopathy.   Pulmonary:      Effort: Pulmonary effort is normal.      Breath sounds: Normal breath sounds.   Chest:      Chest wall: Not tender to palpatation.   Cardiovascular:      Normal rate. Regular rhythm.      Murmurs: There is no murmur.      No gallop. No click.   Abdominal:      General: Bowel sounds are normal. There is no distension.      Palpations: Abdomen is soft.      Tenderness: There is no abdominal tenderness.   Musculoskeletal: Normal range of motion.         General: No tenderness.      Cervical back: Normal range of motion. Skin:     General: Skin is warm and dry.   Neurological:      Mental Status: Alert and oriented to person, place and time.           Results Review:    I reviewed the patient's new clinical results.    WBC WBC   Date/Time Value Ref Range Status   09/15/2021 0920 5.81 3.40 - 10.80 10*3/mm3 Final      HGB Hemoglobin   Date/Time Value Ref Range Status   09/15/2021 0920 15.2 13.0 - 17.7 g/dL Final       HCT Hematocrit   Date/Time Value Ref Range Status   09/15/2021 0920 44.7 37.5 - 51.0 % Final      Platelets Platelets   Date/Time Value Ref Range Status   09/15/2021 0920 161 140 - 450 10*3/mm3 Final        PT/INR:    No results found for: PROTIME, INR    Sodium Sodium   Date/Time Value Ref Range Status   09/15/2021 0920 140 136 - 145 mmol/L Final      Potassium Potassium   Date/Time Value Ref Range Status   09/15/2021 0920 3.7 3.5 - 5.2 mmol/L Final      Chloride Chloride   Date/Time Value Ref Range Status   09/15/2021 0920 106 98 - 107 mmol/L Final      Bicarbonate No results found for: PLASMABICARB   BUN BUN   Date/Time Value Ref Range Status   09/15/2021 0920 19 6 - 20 mg/dL Final      Creatinine Creatinine   Date/Time Value Ref Range Status   09/15/2021 0920 1.29 (H) 0.76 - 1.27 mg/dL Final      Calcium Calcium   Date/Time Value Ref Range Status   09/15/2021 0920 9.3 8.6 - 10.5 mg/dL Final      Magnesium @RESULFAST(MG:3)@   Troponin       No results found for: TROPONINI                EKG: normal EKG, normal sinus rhythm.      Patient Active Problem List   Diagnosis   • Chest pain   • Essential hypertension       Assessment/Plan   51-year-old male with hypertension.  Admitted to [facility] ED today with a several week history of chest pain which he describes as a gripping with associated palpitations.    Troponin negative  Telemetry reveals frequent PVCs.  Will reinstitute Toprol-XL 50 mg p.o. daily  We will schedule patient for an outpatient stress test as soon as possible      I discussed the patient's findings and my recommendations with patient and primary care team    RJ Stevenson  09/15/21  12:24 EDT

## 2021-09-15 NOTE — ED PROVIDER NOTES
Subjective   This patient is a very nice 51-year-old gentleman with a history of hypertension who comes in with chest pain.  Although initial triage note indicated that the chest pain started at 5 PM yesterday, the patient tells me he has been having increasing chest tightness and chest pain over the last 4 to 6 weeks.  He tells me that prior to this period of time, he has no history of chest pain.  Denies any history of provocative testing including but not limited to stress test or elective catheterization.  He tells me has a history of adrenal mass that is followed by endocrinology.  He takes medications accordingly because he now has hypertension.  Denies a history of dyslipidemia, diabetes, CAD or CVA.  He tells me the chest pain is a substernal pressure-like tightness that occurs several times per day.  It has been increasing in frequency and severity over the last 4 to 6 weeks.  He tells me he does not get specifically short of breath or diaphoretic.  He also denies any nausea or vomiting during these events.  He tells me nothing specifically makes it better or worse and that most of the time it occurs, it occurs somewhat spontaneously.  Tells report some periodic palpitations during these episodes.  Patient is pleasant, oriented x4.  Denies any fever chills or cough.  Denies any Covid exposure or known Covid diagnosis.  He is resting comfortably at this time.  He is unaccompanied.    Past medical history  Hypertension, arthritis, depression, adrenal mass per patient    Family history  Negative for CAD in brothers, parents or other family.  Denies any CVA as well.          Review of Systems   Constitutional: Negative.  Negative for chills, fatigue, fever and unexpected weight change.   HENT: Negative for dental problem, ear pain, hearing loss and sinus pressure.    Eyes: Negative for pain and visual disturbance.   Respiratory: Positive for chest tightness. Negative for shortness of breath.    Cardiovascular:  Positive for chest pain and palpitations. Negative for leg swelling.   Gastrointestinal: Negative for blood in stool, diarrhea, nausea and vomiting.   Genitourinary: Negative for difficulty urinating, dysuria, frequency, hematuria and urgency.   Musculoskeletal: Negative for myalgias, neck pain and neck stiffness.   Neurological: Negative for seizures, syncope, speech difficulty, light-headedness and headaches.   Psychiatric/Behavioral: Negative for confusion.   All other systems reviewed and are negative.      Past Medical History:   Diagnosis Date   • Arthritis    • Depression    • Hypertension    • Kidney stones        No Known Allergies    Past Surgical History:   Procedure Laterality Date   • HIP SURGERY Right    • SHOULDER SURGERY Right        Family History   Problem Relation Age of Onset   • Cancer Mother    • Mental illness Mother    • Arthritis Father    • Hypertension Father    • Mental illness Brother    • Arthritis Maternal Grandmother    • Hypertension Maternal Grandmother    • Mental illness Maternal Grandfather    • Arthritis Paternal Grandmother    • Hypertension Paternal Grandmother        Social History     Socioeconomic History   • Marital status:      Spouse name: Not on file   • Number of children: Not on file   • Years of education: Not on file   • Highest education level: Not on file   Tobacco Use   • Smoking status: Never Smoker   • Smokeless tobacco: Never Used   Vaping Use   • Vaping Use: Never used   Substance and Sexual Activity   • Alcohol use: Yes     Comment: occa   • Drug use: Never   • Sexual activity: Defer           Objective   Physical Exam  Vitals and nursing note reviewed.   Constitutional:       General: He is not in acute distress.     Appearance: He is well-developed. He is not toxic-appearing.   HENT:      Head: Normocephalic and atraumatic.      Jaw: No trismus.      Right Ear: Tympanic membrane, ear canal and external ear normal.      Left Ear: Tympanic membrane,  ear canal and external ear normal.      Nose: Nose normal.      Mouth/Throat:      Mouth: Mucous membranes are moist. Mucous membranes are not dry. No oral lesions.      Dentition: No dental abscesses.      Pharynx: Oropharynx is clear. No posterior oropharyngeal erythema or uvula swelling.      Tonsils: No tonsillar exudate or tonsillar abscesses.   Eyes:      General:         Right eye: No discharge.         Left eye: No discharge.      Extraocular Movements: Extraocular movements intact.      Conjunctiva/sclera: Conjunctivae normal.      Right eye: Right conjunctiva is not injected.      Left eye: Left conjunctiva is not injected.      Pupils: Pupils are equal, round, and reactive to light.   Neck:      Vascular: No JVD.      Trachea: No tracheal tenderness.   Cardiovascular:      Rate and Rhythm: Normal rate and regular rhythm.      Heart sounds: Normal heart sounds. No friction rub. No gallop.    Pulmonary:      Effort: Pulmonary effort is normal.      Breath sounds: Normal breath sounds. No wheezing or rales.   Chest:      Chest wall: No tenderness.   Abdominal:      General: Bowel sounds are normal. There is no distension.      Palpations: Abdomen is soft. Abdomen is not rigid. There is no mass or pulsatile mass.      Tenderness: There is no abdominal tenderness. There is no guarding or rebound. Negative signs include McBurney's sign.      Comments: No signs of acute abdomen.  No pain at McBurney's point.  No pulsatile abdominal mass.   Musculoskeletal:         General: No tenderness or deformity. Normal range of motion.      Cervical back: Normal range of motion and neck supple. No rigidity. Normal range of motion.   Lymphadenopathy:      Cervical: No cervical adenopathy.   Skin:     General: Skin is warm and dry.      Capillary Refill: Capillary refill takes less than 2 seconds.      Findings: No erythema or rash.      Comments: No diaphoresis, lesions, nevi, petechia, purpura   Neurological:      Mental  Status: He is alert and oriented to person, place, and time.      Cranial Nerves: No cranial nerve deficit.      Sensory: No sensory deficit.      Motor: No tremor or abnormal muscle tone.      Comments: 5/5 strength bilaterally with flexion and extension of fingers, wrist, elbows, knees and hips as well as plantar and dorsiflexion of the foot.   Psychiatric:         Attention and Perception: He is attentive.         Speech: Speech normal.         Behavior: Behavior normal.         Thought Content: Thought content normal.         Judgment: Judgment normal.         Procedures           ED Course  ED Course as of Sep 16 1529   Wed Sep 15, 2021   1021 The patient's heart score is 2 secondary to age and the risk factor of hypertension.  EKG is unremarkable.  Troponin, D-dimer, lipase, BMP, CBC essentially unremarkable.  Creatinine shows mild elevation at 1.29, otherwise CMP essentially unremarkable.  Patient is resting comfortably.  Aspirin has been ordered.  I plan to discuss with cardiology and have them come down to evaluate the patient at the bedside.  I am slightly concerned about the patient's story of what sounds like potential unstable angina over the last 4 to 6 weeks.  Certainly outpatient stress testing versus more aggressive provocative testing to be considered.  Patient resting comfortably.  Final impression and plan will be noted, pending Cardiologic evaluation.  Patient agreeable to the plan without question or complaint.  Very appreciative for care.  Chest x-ray has been reviewed independently by me but not as of yet read by radiology.  We will add results once completed.    [MADELYN]   1023 Cardiology officially consulted.  I discussed the case with Brandi at approximately 10:24 AM Eastern time.  They will send someone down to evaluate the patient.    [MADELYN]   1106 Chest x-ray has been reviewed by radiology and is negative for acute abnormality.  As previously noted, I reviewed imaging as well and agree.    [MADELYN]    1150 I discussed case with Daniel, from Dr. Marley's office.  Patient was evaluated at the bedside.  PVCs were noted in patient's atypical chest pain story was discussed.  Daniel is going to ensure that the patient has a follow-up appointment with them as an outpatient.  He was comfortable with the patient following up as an outpatient.  He is going to place patient back on Toprol-XL 50 mg daily and asked me to prescribe this with 90 tablets given.  I did this and discontinued the patient's old Toprol-XL prescription, which he evidently does not have anymore.  Patient should follow-up with his PCP in the next week, follow-up with cardiology at the appointment time scheduled for him return immediately for new or change concerns.  Patient was agreeable to the plan and without question or complaint.  Impression will include PVCs, atypical chest pain.  Plan include mandatory follow-up with PCP and cardiology next week.  Outpatient stress testing.  Toprol-XL as prescribed.    [MADELYN]   1249 First and second troponin unremarkable.    [MADELYN]      ED Course User Index  [MADELYN] Ernie Smith MD     No results found for this or any previous visit (from the past 24 hour(s)).  Note: In addition to lab results from this visit, the labs listed above may include labs taken at another facility or during a different encounter within the last 24 hours. Please correlate lab times with ED admission and discharge times for further clarification of the services performed during this visit.    XR Chest 1 View   Preliminary Result   No acute cardiopulmonary disease.       D:  09/15/2021   E:  09/15/2021                Vitals:    09/15/21 0932 09/15/21 1000 09/15/21 1100 09/15/21 1129   BP: 132/90 140/84 151/91 148/97   Patient Position:       Pulse: 79 76 75 76   Resp:    18   Temp:       TempSrc:       SpO2: 98% 94% 96% 96%   Weight:       Height:         Medications   aspirin chewable tablet 324 mg (324 mg Oral Given 9/15/21 1131)      ECG/EMG Results (last 24 hours)     Procedure Component Value Units Date/Time    ECG 12 Lead [[id number]] Collected: 09/15/21 0917     Updated: 09/15/21 1359     QT Interval 354 ms      QTC Interval 395 ms     Narrative:      Test Reason : chest pain  Blood Pressure :   */*   mmHG  Vent. Rate :  75 BPM     Atrial Rate :  75 BPM     P-R Int : 156 ms          QRS Dur :  96 ms      QT Int : 354 ms       P-R-T Axes :  60  39  41 degrees     QTc Int : 395 ms    Normal sinus rhythm  Normal ECG  No previous ECGs available  Confirmed by RICHA CASTILLO MD ([id number]) on 9/15/2021 1:59:13 PM    Referred By:            Confirmed By: RICHA CASTILLO MD    ECG 12 Lead [[id number]] Collected: 09/15/21 1124     Updated: 09/15/21 1359     QT Interval 366 ms      QTC Interval 414 ms     Narrative:      Test Reason : chest pain  Blood Pressure :   */*   mmHG  Vent. Rate :  77 BPM     Atrial Rate :  77 BPM     P-R Int : 166 ms          QRS Dur :  96 ms      QT Int : 366 ms       P-R-T Axes :  65  34  26 degrees     QTc Int : 414 ms    Normal sinus rhythm  Normal ECG  When compared with ECG of 15-SEP-2021 09:17, (Unconfirmed)  No significant change was found  Confirmed by RICHA CASTILLO MD ([id number]) on 9/15/2021 1:59:19 PM    Referred By: EDMD           Confirmed By: RICHA CASTILLO MD        ECG 12 Lead   Final Result   Test Reason : chest pain   Blood Pressure :   */*   mmHG   Vent. Rate :  77 BPM     Atrial Rate :  77 BPM      P-R Int : 166 ms          QRS Dur :  96 ms       QT Int : 366 ms       P-R-T Axes :  65  34  26 degrees      QTc Int : 414 ms      Normal sinus rhythm   Normal ECG   When compared with ECG of 15-SEP-2021 09:17, (Unconfirmed)   No significant change was found   Confirmed by RICHA CASTILLO MD ([id number]) on 9/15/2021 1:59:19 PM      Referred By: EDMD           Confirmed By: RICHA CASTILLO MD      ECG 12 Lead   Final Result   Test Reason : chest pain   Blood Pressure :   */*   mmHG   Vent. Rate :  75 BPM     Atrial Rate :   75 BPM      P-R Int : 156 ms          QRS Dur :  96 ms       QT Int : 354 ms       P-R-T Axes :  60  39  41 degrees      QTc Int : 395 ms      Normal sinus rhythm   Normal ECG   No previous ECGs available   Confirmed by ERNIE SMITH MD ([id number]) on 9/15/2021 1:59:13 PM      Referred By:            Confirmed By: ERNIE SMITH MD                                                  [facility]    Final diagnoses:   PVC's (premature ventricular contractions)   Atypical chest pain       ED Disposition  ED Disposition     ED Disposition Condition Comment    Discharge Stable           Walter Arrington MD  [address] [place] [address]  [phone]               Where to Get Your Medications      These medications were sent to [facility]pharm[facility] #[id number] - [place], [place] - [address] [place] [address] AT [facility] - [phone]  - [phone] [place] [id number]    Phone: [phone]   · metoprolol succinate XL 50 MG 24 hr tablet        Medication List      No changes were made to your prescriptions during this visit.          Ernie Smith MD  09/16/21 9553

## 2021-09-15 NOTE — DISCHARGE INSTRUCTIONS
Follow-up with your PCP in the next week.    Your cardiologist will call you for an appointment.  You are likely to be seen in the next week and should anticipate outpatient stress testing.    I wrote you a new prescription for Toprol-XL 50 mg daily.  I discontinued your old prescription.    Return to the emergency department immediately for new or change concerns.

## 2021-09-16 ENCOUNTER — TELEPHONE (OUTPATIENT)
Dept: ENDOCRINOLOGY | Facility: CLINIC | Age: 51
End: 2021-09-16

## 2021-09-30 ENCOUNTER — TELEPHONE (OUTPATIENT)
Dept: ENDOCRINOLOGY | Facility: CLINIC | Age: 51
End: 2021-09-30

## 2021-09-30 DIAGNOSIS — I10 ESSENTIAL HYPERTENSION, BENIGN: ICD-10-CM

## 2021-09-30 RX ORDER — POTASSIUM CHLORIDE 20 MEQ/1
20 TABLET, EXTENDED RELEASE ORAL DAILY
Qty: 90 TABLET | Refills: 0 | Status: SHIPPED | OUTPATIENT
Start: 2021-09-30 | End: 2022-11-07

## 2021-09-30 NOTE — TELEPHONE ENCOUNTER
Order for BMP is on chart. Ok to print and mail to patient. Prescription signed. Regarding FMLA- I do not know the context of need for FMLA and thus cannot say if it would be appropriate for me to complete versus another provider. (for example, I would generally only complete if FMLA needed for time off for office visit with me.) We can discuss during appointment, if needed.

## 2021-09-30 NOTE — TELEPHONE ENCOUNTER
Last seen on 6-28-21 and next appt is not scheduled    Also see message for lab order and paperwork

## 2021-09-30 NOTE — TELEPHONE ENCOUNTER
PT CALLED REQUESTING A LAB REQ FOR HIS POTASSIUM. WANTS 3MOS SUPPLY OF POTASSIUM. HAS FMLA PAPERWORK HE WANTS FILLED. PT REQUESTED A CALL BACK

## 2021-10-07 RX ORDER — POTASSIUM CHLORIDE 1500 MG/1
TABLET, FILM COATED, EXTENDED RELEASE ORAL
Qty: 180 TABLET | Refills: 0 | Status: SHIPPED | OUTPATIENT
Start: 2021-10-07 | End: 2021-11-03

## 2021-11-03 RX ORDER — POTASSIUM CHLORIDE 1500 MG/1
TABLET, FILM COATED, EXTENDED RELEASE ORAL
Qty: 180 TABLET | Refills: 0 | Status: SHIPPED | OUTPATIENT
Start: 2021-11-03 | End: 2022-11-07

## 2022-05-05 DIAGNOSIS — E29.9 TESTICULAR DYSFUNCTION: Primary | ICD-10-CM

## 2022-05-06 RX ORDER — TESTOSTERONE CYPIONATE 200 MG/ML
INJECTION, SOLUTION INTRAMUSCULAR
Qty: 2 ML | Refills: 2 | Status: SHIPPED | OUTPATIENT
Start: 2022-05-06 | End: 2022-08-18

## 2022-06-20 RX ORDER — METOPROLOL SUCCINATE 50 MG/1
TABLET, EXTENDED RELEASE ORAL
Qty: 90 TABLET | Refills: 1 | Status: SHIPPED | OUTPATIENT
Start: 2022-06-20 | End: 2022-12-05

## 2022-06-27 RX ORDER — AMLODIPINE BESYLATE 10 MG/1
TABLET ORAL
Qty: 90 TABLET | Refills: 0 | Status: SHIPPED | OUTPATIENT
Start: 2022-06-27 | End: 2022-09-13

## 2022-07-11 RX ORDER — PRAMIPEXOLE 1.5 MG/1
TABLET, EXTENDED RELEASE ORAL
Qty: 90 TABLET | Refills: 0 | Status: SHIPPED | OUTPATIENT
Start: 2022-07-11 | End: 2022-10-04

## 2022-07-26 RX ORDER — DULOXETIN HYDROCHLORIDE 60 MG/1
CAPSULE, DELAYED RELEASE ORAL
Qty: 90 CAPSULE | Refills: 1 | Status: SHIPPED | OUTPATIENT
Start: 2022-07-26 | End: 2023-01-28

## 2022-07-26 RX ORDER — LAMOTRIGINE 150 MG/1
TABLET ORAL
Qty: 90 TABLET | Refills: 1 | Status: SHIPPED | OUTPATIENT
Start: 2022-07-26 | End: 2023-01-28

## 2022-08-16 DIAGNOSIS — E29.9 TESTICULAR DYSFUNCTION: ICD-10-CM

## 2022-08-18 RX ORDER — TESTOSTERONE CYPIONATE 200 MG/ML
INJECTION, SOLUTION INTRAMUSCULAR
Qty: 2 ML | Refills: 2 | Status: SHIPPED | OUTPATIENT
Start: 2022-08-18 | End: 2022-12-01

## 2022-09-13 RX ORDER — AMLODIPINE BESYLATE 10 MG/1
TABLET ORAL
Qty: 90 TABLET | Refills: 0 | Status: SHIPPED | OUTPATIENT
Start: 2022-09-13 | End: 2022-12-17

## 2022-10-04 RX ORDER — PRAMIPEXOLE 1.5 MG/1
TABLET, EXTENDED RELEASE ORAL
Qty: 90 TABLET | Refills: 0 | Status: SHIPPED | OUTPATIENT
Start: 2022-10-04 | End: 2023-01-28

## 2022-11-07 ENCOUNTER — OFFICE VISIT (OUTPATIENT)
Dept: FAMILY MEDICINE CLINIC | Facility: CLINIC | Age: 52
End: 2022-11-07

## 2022-11-07 VITALS
DIASTOLIC BLOOD PRESSURE: 80 MMHG | SYSTOLIC BLOOD PRESSURE: 128 MMHG | HEIGHT: 72 IN | BODY MASS INDEX: 34.54 KG/M2 | WEIGHT: 255 LBS

## 2022-11-07 DIAGNOSIS — Z11.59 ENCOUNTER FOR HEPATITIS C SCREENING TEST FOR LOW RISK PATIENT: ICD-10-CM

## 2022-11-07 DIAGNOSIS — Z00.00 WELL ADULT EXAM: Primary | ICD-10-CM

## 2022-11-07 DIAGNOSIS — Z12.5 PROSTATE CANCER SCREENING: ICD-10-CM

## 2022-11-07 DIAGNOSIS — E29.9 TESTICULAR DYSFUNCTION: ICD-10-CM

## 2022-11-07 PROCEDURE — 90686 IIV4 VACC NO PRSV 0.5 ML IM: CPT | Performed by: STUDENT IN AN ORGANIZED HEALTH CARE EDUCATION/TRAINING PROGRAM

## 2022-11-07 PROCEDURE — 99396 PREV VISIT EST AGE 40-64: CPT | Performed by: STUDENT IN AN ORGANIZED HEALTH CARE EDUCATION/TRAINING PROGRAM

## 2022-11-07 PROCEDURE — 90471 IMMUNIZATION ADMIN: CPT | Performed by: STUDENT IN AN ORGANIZED HEALTH CARE EDUCATION/TRAINING PROGRAM

## 2022-11-07 PROCEDURE — 36415 COLL VENOUS BLD VENIPUNCTURE: CPT | Performed by: STUDENT IN AN ORGANIZED HEALTH CARE EDUCATION/TRAINING PROGRAM

## 2022-11-07 NOTE — PROGRESS NOTES
"Chief Complaint  Annual Exam    Subjective          Damon Blum presents to [facility]  History of Present Illness    Patient is here for physical and medication refills.     He states that he is overall doing well at this time. He has no complaints.     He would like to discuss possibly getting back on celebrex as he was on this previously and was doing very well with it. He states that he was having trouble with his kidney function and blood pressure so he was taken off the medciaiton. He recently had adrenal gland surgery and since then his blood pressure has been very good, but he is not sure if he is able to get back on the celebrex.     He is due for blood work, today including HCV, CR, and baseline labs.     He does not need medications refills at this time.       Objective   Vital Signs:   /80 (BP Location: Left arm, Patient Position: Sitting, Cuff Size: Large Adult)   Ht 182.9 cm (72\")   Wt 116 kg (255 lb)   BMI 34.58 kg/m²     Body mass index is 34.58 kg/m².    Review of Systems    Past History:  Medical History: has a past medical history of Arthritis, Depression, Hypertension, and Kidney stones.   Surgical History: has a past surgical history that includes Shoulder surgery (Right) and Hip surgery (Right).   Family History: family history includes Alzheimer's disease in his paternal grandfather; Arthritis in his father, maternal grandmother, and paternal grandmother; Asthma in his son; Breast cancer in his sister; Cancer in his mother; Depression in his mother; Hyperlipidemia in his maternal grandmother; Hypertension in his father, maternal grandmother, and paternal grandmother; Lung cancer in his mother; Mental illness in his brother, maternal grandfather, and mother.   Social History: reports that he has never smoked. He has never used smokeless tobacco. He reports that he does not currently use alcohol. He reports that he does not use drugs.      Current " Outpatient Medications:   •  amLODIPine (NORVASC) 10 MG tablet, TAKE 1 TABLET DAILY, Disp: 90 tablet, Rfl: 0  •  cetirizine (zyrTEC) 10 MG tablet, Take 10 mg by mouth Daily., Disp: , Rfl:   •  coenzyme Q10 100 MG capsule, Take 100 mg by mouth Daily., Disp: , Rfl:   •  DULoxetine (CYMBALTA) 60 MG capsule, TAKE 1 CAPSULE DAILY, Disp: 90 capsule, Rfl: 1  •  GLUTAMINE PO, Take 1 capsule by mouth Daily., Disp: , Rfl:   •  lamoTRIgine (LaMICtal) 150 MG tablet, TAKE 1 TABLET DAILY, Disp: 90 tablet, Rfl: 1  •  metoprolol succinate XL (TOPROL-XL) 50 MG 24 hr tablet, TAKE 1 TABLET DAILY, Disp: 90 tablet, Rfl: 1  •  Pramipexole Dihydrochloride ER 1.5 MG tablet sustained-release 24 hour, TAKE 1 TABLET DAILY, Disp: 90 tablet, Rfl: 0  •  TADALAFIL PO, Take 1 tablet by mouth Daily., Disp: , Rfl:   •  Testosterone Cypionate (DEPOTESTOTERONE CYPIONATE) 200 MG/ML injection, ADMINISTER 1 ML IN THE MUSCLE EVERY 2 WEEKS, Disp: 2 mL, Rfl: 2  •  Turmeric 500 MG capsule, Take 1 capsule by mouth Daily., Disp: , Rfl:     Allergies: Patient has no known allergies.    Physical Exam  Constitutional:       General: He is not in acute distress.     Appearance: He is not ill-appearing or toxic-appearing.   HENT:      Head: Normocephalic and atraumatic.   Cardiovascular:      Rate and Rhythm: Normal rate and regular rhythm.      Heart sounds: No murmur heard.  Pulmonary:      Effort: Pulmonary effort is normal. No respiratory distress.   Neurological:      General: No focal deficit present.      Mental Status: He is alert and oriented to person, place, and time.   Psychiatric:         Mood and Affect: Mood normal.         Thought Content: Thought content normal.          Result Review :                   Assessment and Plan    Diagnoses and all orders for this visit:    1. Well adult exam (Primary)  -     Comprehensive Metabolic Panel; Future  -     CBC & Differential; Future  -     Lipid Panel; Future  -     TSH; Future  -     T4, Free; Future  -      Comprehensive Metabolic Panel  -     CBC & Differential  -     Lipid Panel  -     TSH  -     T4, Free    2. Testicular dysfunction  -     Testosterone; Future  -     Testosterone    3. Encounter for hepatitis C screening test for low risk patient  -     Hepatitis C antibody; Future  -     Hepatitis C antibody    4. Prostate cancer screening  -     PSA SCREENING; Future  -     PSA SCREENING    Patient is overall doing well at this time. He has no complaints. He is due for blood work and will order.     Will contact patient about possibly starting back on celebrex if his kidney function is normalized. If it remains elevated he may need to be evaluated for other medication management of pain.     Follow up in 3 months for medication refills with primary.     Past medical and surgical history as well as allergies, family history and social history were reviewed, and discussed with patient.  Chronic conditions were reviewed as well as medications.   Anticipatory guidance handouts including healthy diet, health maintenance, as well as regular exercise and general instructions were given via [employer], and patient was able to ask questions and discuss any concerns.        Follow Up   Return in about 3 months (around 2/7/2023) for Recheck.  Patient was given instructions and counseling regarding his condition or for health maintenance advice. Please see specific information pulled into the AVS if appropriate.     Rola Worthington, DO

## 2022-11-08 LAB
ALBUMIN SERPL-MCNC: 4.6 G/DL (ref 3.8–4.9)
ALBUMIN/GLOB SERPL: 2.2 {RATIO} (ref 1.2–2.2)
ALP SERPL-CCNC: 64 IU/L (ref 44–121)
ALT SERPL-CCNC: 41 IU/L (ref 0–44)
AST SERPL-CCNC: 45 IU/L (ref 0–40)
BASOPHILS # BLD AUTO: 0 X10E3/UL (ref 0–0.2)
BASOPHILS NFR BLD AUTO: 1 %
BILIRUB SERPL-MCNC: 0.5 MG/DL (ref 0–1.2)
BUN SERPL-MCNC: 31 MG/DL (ref 6–24)
BUN/CREAT SERPL: 20 (ref 9–20)
CALCIUM SERPL-MCNC: 9.5 MG/DL (ref 8.7–10.2)
CHLORIDE SERPL-SCNC: 105 MMOL/L (ref 96–106)
CHOLEST SERPL-MCNC: 177 MG/DL (ref 100–199)
CO2 SERPL-SCNC: 21 MMOL/L (ref 20–29)
CREAT SERPL-MCNC: 1.55 MG/DL (ref 0.76–1.27)
EGFRCR SERPLBLD CKD-EPI 2021: 53 ML/MIN/1.73
EOSINOPHIL # BLD AUTO: 0.4 X10E3/UL (ref 0–0.4)
EOSINOPHIL NFR BLD AUTO: 6 %
ERYTHROCYTE [DISTWIDTH] IN BLOOD BY AUTOMATED COUNT: 12.3 % (ref 11.6–15.4)
GLOBULIN SER CALC-MCNC: 2.1 G/DL (ref 1.5–4.5)
GLUCOSE SERPL-MCNC: 89 MG/DL (ref 70–99)
HCT VFR BLD AUTO: 48.9 % (ref 37.5–51)
HCV AB S/CO SERPL IA: <0.1 S/CO RATIO (ref 0–0.9)
HDLC SERPL-MCNC: 66 MG/DL
HGB BLD-MCNC: 16.1 G/DL (ref 13–17.7)
IMM GRANULOCYTES # BLD AUTO: 0 X10E3/UL (ref 0–0.1)
IMM GRANULOCYTES NFR BLD AUTO: 0 %
LDLC SERPL CALC-MCNC: 94 MG/DL (ref 0–99)
LYMPHOCYTES # BLD AUTO: 1.3 X10E3/UL (ref 0.7–3.1)
LYMPHOCYTES NFR BLD AUTO: 22 %
MCH RBC QN AUTO: 29.2 PG (ref 26.6–33)
MCHC RBC AUTO-ENTMCNC: 32.9 G/DL (ref 31.5–35.7)
MCV RBC AUTO: 89 FL (ref 79–97)
MONOCYTES # BLD AUTO: 0.6 X10E3/UL (ref 0.1–0.9)
MONOCYTES NFR BLD AUTO: 10 %
NEUTROPHILS # BLD AUTO: 3.8 X10E3/UL (ref 1.4–7)
NEUTROPHILS NFR BLD AUTO: 61 %
PLATELET # BLD AUTO: 196 X10E3/UL (ref 150–450)
POTASSIUM SERPL-SCNC: 5 MMOL/L (ref 3.5–5.2)
PROT SERPL-MCNC: 6.7 G/DL (ref 6–8.5)
PSA SERPL-MCNC: 0.9 NG/ML (ref 0–4)
RBC # BLD AUTO: 5.52 X10E6/UL (ref 4.14–5.8)
SODIUM SERPL-SCNC: 139 MMOL/L (ref 134–144)
T4 FREE SERPL-MCNC: 1.04 NG/DL (ref 0.82–1.77)
TESTOST SERPL-MCNC: 1437 NG/DL (ref 264–916)
TRIGL SERPL-MCNC: 91 MG/DL (ref 0–149)
TSH SERPL DL<=0.005 MIU/L-ACNC: 1.2 UIU/ML (ref 0.45–4.5)
VLDLC SERPL CALC-MCNC: 17 MG/DL (ref 5–40)
WBC # BLD AUTO: 6.1 X10E3/UL (ref 3.4–10.8)

## 2022-11-08 RX ORDER — CELECOXIB 100 MG/1
100 CAPSULE ORAL 2 TIMES DAILY PRN
Qty: 60 CAPSULE | Refills: 2 | Status: SHIPPED | OUTPATIENT
Start: 2022-11-08 | End: 2022-12-29 | Stop reason: SDUPTHER

## 2022-11-14 ENCOUNTER — OFFICE VISIT (OUTPATIENT)
Dept: FAMILY MEDICINE CLINIC | Facility: CLINIC | Age: 52
End: 2022-11-14

## 2022-11-14 VITALS
TEMPERATURE: 98.6 F | HEIGHT: 72 IN | WEIGHT: 260.25 LBS | HEART RATE: 99 BPM | OXYGEN SATURATION: 98 % | SYSTOLIC BLOOD PRESSURE: 134 MMHG | DIASTOLIC BLOOD PRESSURE: 84 MMHG | BODY MASS INDEX: 35.25 KG/M2

## 2022-11-14 DIAGNOSIS — J01.00 ACUTE NON-RECURRENT MAXILLARY SINUSITIS: Primary | ICD-10-CM

## 2022-11-14 LAB
EXPIRATION DATE: ABNORMAL
EXPIRATION DATE: NORMAL
FLUAV AG UPPER RESP QL IA.RAPID: NOT DETECTED
FLUBV AG UPPER RESP QL IA.RAPID: NOT DETECTED
INTERNAL CONTROL: ABNORMAL
INTERNAL CONTROL: NORMAL
Lab: ABNORMAL
Lab: NORMAL
S PYO AG THROAT QL: POSITIVE
SARS-COV-2 RNA RESP QL NAA+PROBE: NOT DETECTED

## 2022-11-14 PROCEDURE — 99213 OFFICE O/P EST LOW 20 MIN: CPT | Performed by: NURSE PRACTITIONER

## 2022-11-14 PROCEDURE — 87880 STREP A ASSAY W/OPTIC: CPT | Performed by: NURSE PRACTITIONER

## 2022-11-14 PROCEDURE — 87428 SARSCOV & INF VIR A&B AG IA: CPT | Performed by: NURSE PRACTITIONER

## 2022-11-14 RX ORDER — DOXYCYCLINE HYCLATE 100 MG/1
100 CAPSULE ORAL 2 TIMES DAILY
Qty: 20 CAPSULE | Refills: 0 | Status: SHIPPED | OUTPATIENT
Start: 2022-11-14 | End: 2022-11-14

## 2022-11-14 RX ORDER — AMOXICILLIN AND CLAVULANATE POTASSIUM 875; 125 MG/1; MG/1
1 TABLET, FILM COATED ORAL 2 TIMES DAILY
Qty: 20 TABLET | Refills: 0 | Status: SHIPPED | OUTPATIENT
Start: 2022-11-14 | End: 2022-11-24

## 2022-11-14 NOTE — ASSESSMENT & PLAN NOTE
STREP POSITIVE. Flu and covid negative. Antibiotic as prescribed.  Tylenol as needed pain fever.  Continue with allergy meds.  Mucinex as needed congestion.  Fluids and rest encouraged.  Risk of meds discussed understood.  Return in 2 days if no improvement, sooner if worsens.  Return to clinic or ED with any issues or concerns.

## 2022-11-14 NOTE — PROGRESS NOTES
"Chief Complaint  Sore Throat    Subjective          Damon Blum presents to [facility]  History of Present Illness     Patient states for the past 5+ days he has had worsening sinus congestion.  States sore throat also.  No fever no chills no body aches.  States he is around [place] [employer] [place] lot so is unsure if he has been exposed to anything.  States he has already had this years flu shot and states he is up-to-date on COVID vaccines also.  No GI issues.  Minimal cough.    Objective   Vital Signs:   /84   Pulse 99   Temp 98.6 °F (37 °C)   Ht 182.9 cm (72\")   Wt 118 kg (260 lb 4 oz)   SpO2 98%   BMI 35.30 kg/m²     Body mass index is 35.3 kg/m².    Review of Systems   Constitutional: Negative for chills, fatigue and fever.   HENT: Positive for congestion, sinus pressure and sore throat. Negative for sneezing.    Respiratory: Negative for cough, shortness of breath and wheezing.    Gastrointestinal: Negative for abdominal pain, diarrhea, nausea and vomiting.   Musculoskeletal: Negative for back pain.   Skin: Negative for rash.   Neurological: Negative for headache.       Past History:  Medical History: has a past medical history of Arthritis, Depression, Hypertension, and Kidney stones.   Surgical History: has a past surgical history that includes Shoulder surgery (Right) and Hip surgery (Right).   Family History: family history includes Alzheimer's disease in his paternal grandfather; Arthritis in his father, maternal grandmother, and paternal grandmother; Asthma in his son; Breast cancer in his sister; Cancer in his mother; Depression in his mother; Hyperlipidemia in his maternal grandmother; Hypertension in his father, maternal grandmother, and paternal grandmother; Lung cancer in his mother; Mental illness in his brother, maternal grandfather, and mother.   Social History: reports that he has never smoked. He has never used smokeless tobacco. He reports that he " does not currently use alcohol. He reports that he does not use drugs.    PHQ-2 Depression Screening  Little interest or pleasure in doing things? 0-->not at all   Feeling down, depressed, or hopeless? 0-->not at all   PHQ-2 Total Score 0        PHQ-9 Depression Screening  Little interest or pleasure in doing things? 0-->not at all   Feeling down, depressed, or hopeless? 0-->not at all   Trouble falling or staying asleep, or sleeping too much?     Feeling tired or having little energy?     Poor appetite or overeating?     Feeling bad about yourself - or that you are a failure or have let yourself or your family down?     Trouble concentrating on things, such as reading the newspaper or watching television?     Moving or speaking so slowly that other people could have noticed? Or the opposite - being so fidgety or restless that you have been moving around a lot more than usual?     Thoughts that you would be better off dead, or of hurting yourself in some way?     PHQ-9 Total Score 0   If you checked off any problems, how difficult have these problems made it for you to do your work, take care of things at home, or get along with other people?       PHQ-9 Total Score: 0      Patient screened positive for depression based on a PHQ-9 score of 0 on 11/14/2022. Follow-up recommendations include:       Current Outpatient Medications:   •  amLODIPine (NORVASC) 10 MG tablet, TAKE 1 TABLET DAILY, Disp: 90 tablet, Rfl: 0  •  celecoxib (CeleBREX) 100 MG capsule, Take 1 capsule by mouth 2 (Two) Times a Day As Needed for Mild Pain., Disp: 60 capsule, Rfl: 2  •  cetirizine (zyrTEC) 10 MG tablet, Take 10 mg by mouth Daily., Disp: , Rfl:   •  coenzyme Q10 100 MG capsule, Take 100 mg by mouth Daily., Disp: , Rfl:   •  DULoxetine (CYMBALTA) 60 MG capsule, TAKE 1 CAPSULE DAILY, Disp: 90 capsule, Rfl: 1  •  GLUTAMINE PO, Take 1 capsule by mouth Daily., Disp: , Rfl:   •  lamoTRIgine (LaMICtal) 150 MG tablet, TAKE 1 TABLET DAILY, Disp:  90 tablet, Rfl: 1  •  metoprolol succinate XL (TOPROL-XL) 50 MG 24 hr tablet, TAKE 1 TABLET DAILY, Disp: 90 tablet, Rfl: 1  •  Pramipexole Dihydrochloride ER 1.5 MG tablet sustained-release 24 hour, TAKE 1 TABLET DAILY, Disp: 90 tablet, Rfl: 0  •  TADALAFIL PO, Take 1 tablet by mouth Daily., Disp: , Rfl:   •  Testosterone Cypionate (DEPOTESTOTERONE CYPIONATE) 200 MG/ML injection, ADMINISTER 1 ML IN THE MUSCLE EVERY 2 WEEKS, Disp: 2 mL, Rfl: 2  •  Turmeric 500 MG capsule, Take 1 capsule by mouth Daily., Disp: , Rfl:   •  amoxicillin-clavulanate (Augmentin) 875-125 MG per tablet, Take 1 tablet by mouth 2 (Two) Times a Day for 10 days., Disp: 20 tablet, Rfl: 0   (Not in a hospital admission)     Allergies: Patient has no known allergies.    Physical Exam  Constitutional:       Appearance: Normal appearance.   HENT:      Right Ear: Tympanic membrane, ear canal and external ear normal.      Left Ear: Tympanic membrane, ear canal and external ear normal.      Nose: Congestion present.      Comments: Maxillary sinuses tender bilaterally.     Mouth/Throat:      Mouth: Mucous membranes are moist.      Pharynx: Oropharynx is clear.   Eyes:      Conjunctiva/sclera: Conjunctivae normal.      Pupils: Pupils are equal, round, and reactive to light.   Cardiovascular:      Rate and Rhythm: Normal rate and regular rhythm.      Heart sounds: Normal heart sounds.   Pulmonary:      Effort: Pulmonary effort is normal.      Breath sounds: Normal breath sounds.   Skin:     Findings: No rash.   Neurological:      General: No focal deficit present.      Mental Status: He is alert and oriented to person, place, and time. Mental status is at baseline.   Psychiatric:         Mood and Affect: Mood normal.         Behavior: Behavior normal.         Thought Content: Thought content normal.         Judgment: Judgment normal.          Result Review :                   Assessment and Plan    Diagnoses and all orders for this visit:    1. Acute  non-recurrent maxillary sinusitis (Primary)  Assessment & Plan:  STREP POSITIVE. Flu and covid negative. Antibiotic as prescribed.  Tylenol as needed pain fever.  Continue with allergy meds.  Mucinex as needed congestion.  Fluids and rest encouraged.  Risk of meds discussed understood.  Return in 2 days if no improvement, sooner if worsens.  Return to clinic or ED with any issues or concerns.    Orders:  -     Discontinue: doxycycline (VIBRAMYCIN) 100 MG capsule; Take 1 capsule by mouth 2 (Two) Times a Day.  Dispense: 20 capsule; Refill: 0  -     Cancel: POC Rapid Strep A  -     Cancel: Covid-19 + Flu A&B AG, Veritor  -     Covid-19 + Flu A&B AG, Veritor; Future  -     POC Rapid Strep A; Future  -     amoxicillin-clavulanate (Augmentin) 875-125 MG per tablet; Take 1 tablet by mouth 2 (Two) Times a Day for 10 days.  Dispense: 20 tablet; Refill: 0                 Follow Up   Return if symptoms worsen or fail to improve.  Patient was given instructions and counseling regarding his condition or for health maintenance advice. Please see specific information pulled into the AVS if appropriate.     SHERWIN Brown

## 2022-12-01 DIAGNOSIS — E29.9 TESTICULAR DYSFUNCTION: ICD-10-CM

## 2022-12-01 RX ORDER — TESTOSTERONE CYPIONATE 100 MG/ML
100 INJECTION, SOLUTION INTRAMUSCULAR
Qty: 1 ML | Refills: 3 | Status: SHIPPED | OUTPATIENT
Start: 2022-12-01

## 2022-12-05 RX ORDER — METOPROLOL SUCCINATE 50 MG/1
TABLET, EXTENDED RELEASE ORAL
Qty: 90 TABLET | Refills: 1 | Status: SHIPPED | OUTPATIENT
Start: 2022-12-05

## 2022-12-17 RX ORDER — AMLODIPINE BESYLATE 10 MG/1
TABLET ORAL
Qty: 90 TABLET | Refills: 0 | Status: SHIPPED | OUTPATIENT
Start: 2022-12-17 | End: 2023-03-17

## 2022-12-29 RX ORDER — CELECOXIB 100 MG/1
100 CAPSULE ORAL 2 TIMES DAILY PRN
Qty: 180 CAPSULE | Refills: 1 | Status: SHIPPED | OUTPATIENT
Start: 2022-12-29

## 2023-01-28 RX ORDER — LAMOTRIGINE 150 MG/1
TABLET ORAL
Qty: 90 TABLET | Refills: 1 | Status: SHIPPED | OUTPATIENT
Start: 2023-01-28

## 2023-01-28 RX ORDER — PRAMIPEXOLE 1.5 MG/1
TABLET, EXTENDED RELEASE ORAL
Qty: 90 TABLET | Refills: 0 | Status: SHIPPED | OUTPATIENT
Start: 2023-01-28

## 2023-01-28 RX ORDER — DULOXETIN HYDROCHLORIDE 60 MG/1
CAPSULE, DELAYED RELEASE ORAL
Qty: 90 CAPSULE | Refills: 1 | Status: SHIPPED | OUTPATIENT
Start: 2023-01-28

## 2023-02-07 ENCOUNTER — OFFICE VISIT (OUTPATIENT)
Dept: FAMILY MEDICINE CLINIC | Facility: CLINIC | Age: 53
End: 2023-02-07
Payer: COMMERCIAL

## 2023-02-07 VITALS
HEART RATE: 82 BPM | WEIGHT: 265 LBS | BODY MASS INDEX: 35.89 KG/M2 | SYSTOLIC BLOOD PRESSURE: 136 MMHG | HEIGHT: 72 IN | DIASTOLIC BLOOD PRESSURE: 70 MMHG | OXYGEN SATURATION: 98 %

## 2023-02-07 DIAGNOSIS — E29.9 TESTICULAR DYSFUNCTION: Primary | ICD-10-CM

## 2023-02-07 DIAGNOSIS — N28.9 RENAL INSUFFICIENCY: ICD-10-CM

## 2023-02-07 PROBLEM — E27.8 ADRENAL MASS: Status: ACTIVE | Noted: 2021-11-22

## 2023-02-07 PROBLEM — D35.00 ADRENAL ADENOMA: Status: ACTIVE | Noted: 2021-09-24

## 2023-02-07 PROCEDURE — 99214 OFFICE O/P EST MOD 30 MIN: CPT | Performed by: FAMILY MEDICINE

## 2023-02-07 PROCEDURE — 36415 COLL VENOUS BLD VENIPUNCTURE: CPT | Performed by: FAMILY MEDICINE

## 2023-02-07 RX ORDER — CEPHALEXIN 500 MG/1
500 CAPSULE ORAL 2 TIMES DAILY
Qty: 20 CAPSULE | Refills: 0 | Status: SHIPPED | OUTPATIENT
Start: 2023-02-07

## 2023-02-07 RX ORDER — CIPROFLOXACIN 500 MG/1
500 TABLET, FILM COATED ORAL 2 TIMES DAILY
Qty: 14 TABLET | Refills: 0 | Status: SHIPPED | OUTPATIENT
Start: 2023-02-07

## 2023-02-07 NOTE — PROGRESS NOTES
Follow Up Office Visit      Date of Visit:  2023   Patient Name: Damon Blum  : 1970   MRN: [id number]     Chief Complaint:    Chief Complaint   Patient presents with   • testosterone level       History of Present Illness: Damon Blum is a 52 y.o. male who is here today for follow up.  Patient following up on testosterone supplementation.  He has been off supplementation now for a little while and would like to recheck his levels.  They were very high last time.  He suspects that he actually does not need replacement at this time.  We also want to recheck his renal function.  He has been taking Celebrex for some osteoarthritis.  We also discussed some as needed medications to take along with him [facility] an upcoming [facility] [employer] [facility]        Subjective      Review of Systems:   Review of Systems   Constitutional: Negative for fatigue and fever.   HENT: Negative for congestion and ear pain.    Respiratory: Negative for apnea, cough, chest tightness and shortness of breath.    Cardiovascular: Negative for chest pain.   Gastrointestinal: Negative for abdominal pain, constipation, diarrhea and nausea.   Musculoskeletal: Negative for arthralgias.   Psychiatric/Behavioral: Negative for depressed mood and stress.       Past Medical History:   Past Medical History:   Diagnosis Date   • Arthritis    • Depression    • Hypertension    • Kidney stones     PASSED ON OWN       Past Surgical History:   Past Surgical History:   Procedure Laterality Date   • HIP SURGERY Right    • SHOULDER SURGERY Right        Family History:   Family History   Problem Relation Age of Onset   • Cancer Mother    • Mental illness Mother    • Lung cancer Mother    • Depression Mother    • Arthritis Father    • Hypertension Father    • Breast cancer Sister    • Mental illness Brother    • Arthritis Maternal Grandmother    • Hypertension Maternal Grandmother    • Hyperlipidemia Maternal Grandmother     • Mental illness Maternal Grandfather    • Arthritis Paternal Grandmother    • Hypertension Paternal Grandmother    • Alzheimer's disease Paternal Grandfather    • Asthma Son        Social History:   Social History     Socioeconomic History   • Marital status:    Tobacco Use   • Smoking status: Never   • Smokeless tobacco: Never   Vaping Use   • Vaping Use: Never used   Substance and Sexual Activity   • Alcohol use: Not Currently     Comment: occa   • Drug use: Never   • Sexual activity: Defer       Medications:     Current Outpatient Medications:   •  amLODIPine (NORVASC) 10 MG tablet, TAKE 1 TABLET DAILY, Disp: 90 tablet, Rfl: 0  •  celecoxib (CeleBREX) 100 MG capsule, Take 1 capsule by mouth 2 (Two) Times a Day As Needed for Mild Pain., Disp: 180 capsule, Rfl: 1  •  cephalexin (Keflex) 500 MG capsule, Take 1 capsule by mouth 2 (Two) Times a Day., Disp: 20 capsule, Rfl: 0  •  cetirizine (zyrTEC) 10 MG tablet, Take 10 mg by mouth Daily., Disp: , Rfl:   •  ciprofloxacin (Cipro) 500 MG tablet, Take 1 tablet by mouth 2 (Two) Times a Day., Disp: 14 tablet, Rfl: 0  •  coenzyme Q10 100 MG capsule, Take 100 mg by mouth Daily., Disp: , Rfl:   •  DULoxetine (CYMBALTA) 60 MG capsule, TAKE 1 CAPSULE DAILY, Disp: 90 capsule, Rfl: 1  •  GLUTAMINE PO, Take 1 capsule by mouth Daily., Disp: , Rfl:   •  lamoTRIgine (LaMICtal) 150 MG tablet, TAKE 1 TABLET DAILY, Disp: 90 tablet, Rfl: 1  •  metoprolol succinate XL (TOPROL-XL) 50 MG 24 hr tablet, TAKE 1 TABLET DAILY, Disp: 90 tablet, Rfl: 1  •  Pramipexole Dihydrochloride ER 1.5 MG tablet sustained-release 24 hour, TAKE 1 TABLET DAILY, Disp: 90 tablet, Rfl: 0  •  TADALAFIL PO, Take 1 tablet by mouth Daily., Disp: , Rfl:   •  testosterone cypionate (Depo-Testosterone) 100 MG/ML solution injection, Inject 1 mL into the appropriate muscle as directed by prescriber Every 14 (Fourteen) Days., Disp: 1 mL, Rfl: 3  •  Turmeric 500 MG capsule, Take 1 capsule by mouth Daily., Disp: ,  "Rfl:     Allergies:   No Known Allergies    Objective     Physical Exam:  Vital Signs:   Vitals:    02/07/23 0805   BP: 136/70   Pulse: 82   SpO2: 98%   Weight: 120 kg (265 lb)   Height: 182.9 cm (72\")     Body mass index is 35.94 kg/m².     Physical Exam  Vitals and nursing note reviewed.   Constitutional:       General: He is not in acute distress.     Appearance: Normal appearance. He is not ill-appearing.   HENT:      Head: Normocephalic and atraumatic.      Right Ear: Tympanic membrane and ear canal normal.      Left Ear: Tympanic membrane and ear canal normal.      Nose: Nose normal.   Cardiovascular:      Rate and Rhythm: Normal rate and regular rhythm.      Heart sounds: Normal heart sounds.   Pulmonary:      Effort: Pulmonary effort is normal.      Breath sounds: Normal breath sounds.   Neurological:      Mental Status: He is alert and oriented to person, place, and time. Mental status is at baseline.   Psychiatric:         Mood and Affect: Mood normal.         Procedures      Assessment / Plan      Assessment/Plan:   Diagnoses and all orders for this visit:    1. Testicular dysfunction (Primary)  -     Testosterone; Future  -     Testosterone    2. Renal insufficiency  -     Comprehensive Metabolic Panel; Future  -     Comprehensive Metabolic Panel    Other orders  -     ciprofloxacin (Cipro) 500 MG tablet; Take 1 tablet by mouth 2 (Two) Times a Day.  Dispense: 14 tablet; Refill: 0  -     cephalexin (Keflex) 500 MG capsule; Take 1 capsule by mouth 2 (Two) Times a Day.  Dispense: 20 capsule; Refill: 0         Recheck testosterone level.  Good chance that he actually does not need to take supplementation anymore.  Also check CMP because of some prior renal insufficiency and him taking Celebrex.  Gave antibiotics just to have on hand for an upcoming trip of hiking the [employer].    Follow Up:   No follow-ups on file.    Walter Arrington  [facility] [place]   "

## 2023-02-08 LAB
ALBUMIN SERPL-MCNC: 4.3 G/DL (ref 3.8–4.9)
ALBUMIN/GLOB SERPL: 2 {RATIO} (ref 1.2–2.2)
ALP SERPL-CCNC: 70 IU/L (ref 44–121)
ALT SERPL-CCNC: 29 IU/L (ref 0–44)
AST SERPL-CCNC: 25 IU/L (ref 0–40)
BILIRUB SERPL-MCNC: 0.3 MG/DL (ref 0–1.2)
BUN SERPL-MCNC: 28 MG/DL (ref 6–24)
BUN/CREAT SERPL: 19 (ref 9–20)
CALCIUM SERPL-MCNC: 9.3 MG/DL (ref 8.7–10.2)
CHLORIDE SERPL-SCNC: 103 MMOL/L (ref 96–106)
CO2 SERPL-SCNC: 23 MMOL/L (ref 20–29)
CREAT SERPL-MCNC: 1.48 MG/DL (ref 0.76–1.27)
EGFRCR SERPLBLD CKD-EPI 2021: 56 ML/MIN/1.73
GLOBULIN SER CALC-MCNC: 2.2 G/DL (ref 1.5–4.5)
GLUCOSE SERPL-MCNC: 90 MG/DL (ref 70–99)
POTASSIUM SERPL-SCNC: 5.4 MMOL/L (ref 3.5–5.2)
PROT SERPL-MCNC: 6.5 G/DL (ref 6–8.5)
SODIUM SERPL-SCNC: 139 MMOL/L (ref 134–144)
TESTOST SERPL-MCNC: 393 NG/DL (ref 264–916)

## 2023-02-09 ENCOUNTER — PATIENT MESSAGE (OUTPATIENT)
Dept: FAMILY MEDICINE CLINIC | Facility: CLINIC | Age: 53
End: 2023-02-09
Payer: COMMERCIAL

## 2023-02-09 DIAGNOSIS — E29.9 TESTICULAR DYSFUNCTION: Primary | ICD-10-CM

## 2023-02-13 NOTE — TELEPHONE ENCOUNTER
From: Damon Blum  To: Walter Arrington  Sent: 2/9/2023 7:32 AM EST  Subject: Test results     Testosterone is a bit low, but still within tolerance. I'm thinking I might like to keep going without the therapy, especially since you think we can expect it to go up with all the hiking and fat loss. What do you think?   Creatinine is still high, but better. I'll substitute Tylenol for the celebrex. Right?

## 2023-03-15 ENCOUNTER — TELEPHONE (OUTPATIENT)
Dept: FAMILY MEDICINE CLINIC | Facility: CLINIC | Age: 53
End: 2023-03-15
Payer: COMMERCIAL

## 2023-03-15 ENCOUNTER — LAB (OUTPATIENT)
Dept: FAMILY MEDICINE CLINIC | Facility: CLINIC | Age: 53
End: 2023-03-15
Payer: COMMERCIAL

## 2023-03-15 DIAGNOSIS — E29.9 TESTICULAR DYSFUNCTION: ICD-10-CM

## 2023-03-15 PROCEDURE — 36415 COLL VENOUS BLD VENIPUNCTURE: CPT | Performed by: FAMILY MEDICINE

## 2023-03-16 LAB
BASOPHILS # BLD AUTO: 0 X10E3/UL (ref 0–0.2)
BASOPHILS NFR BLD AUTO: 1 %
EOSINOPHIL # BLD AUTO: 0.4 X10E3/UL (ref 0–0.4)
EOSINOPHIL NFR BLD AUTO: 6 %
ERYTHROCYTE [DISTWIDTH] IN BLOOD BY AUTOMATED COUNT: 13.4 % (ref 11.6–15.4)
HCT VFR BLD AUTO: 45.7 % (ref 37.5–51)
HGB BLD-MCNC: 15.3 G/DL (ref 13–17.7)
IMM GRANULOCYTES # BLD AUTO: 0 X10E3/UL (ref 0–0.1)
IMM GRANULOCYTES NFR BLD AUTO: 0 %
LYMPHOCYTES # BLD AUTO: 1.3 X10E3/UL (ref 0.7–3.1)
LYMPHOCYTES NFR BLD AUTO: 21 %
MCH RBC QN AUTO: 28.6 PG (ref 26.6–33)
MCHC RBC AUTO-ENTMCNC: 33.5 G/DL (ref 31.5–35.7)
MCV RBC AUTO: 85 FL (ref 79–97)
MONOCYTES # BLD AUTO: 0.7 X10E3/UL (ref 0.1–0.9)
MONOCYTES NFR BLD AUTO: 12 %
NEUTROPHILS # BLD AUTO: 3.8 X10E3/UL (ref 1.4–7)
NEUTROPHILS NFR BLD AUTO: 60 %
PLATELET # BLD AUTO: 180 X10E3/UL (ref 150–450)
RBC # BLD AUTO: 5.35 X10E6/UL (ref 4.14–5.8)
TESTOST SERPL-MCNC: 367 NG/DL (ref 264–916)
WBC # BLD AUTO: 6.3 X10E3/UL (ref 3.4–10.8)

## 2023-03-17 RX ORDER — AMLODIPINE BESYLATE 10 MG/1
TABLET ORAL
Qty: 90 TABLET | Refills: 1 | Status: SHIPPED | OUTPATIENT
Start: 2023-03-17

## 2023-04-28 RX ORDER — PRAMIPEXOLE 1.5 MG/1
TABLET, EXTENDED RELEASE ORAL
Qty: 90 TABLET | Refills: 0 | Status: SHIPPED | OUTPATIENT
Start: 2023-04-28

## 2023-06-15 RX ORDER — METOPROLOL SUCCINATE 50 MG/1
TABLET, EXTENDED RELEASE ORAL
Qty: 90 TABLET | Refills: 0 | Status: SHIPPED | OUTPATIENT
Start: 2023-06-15

## 2023-06-19 ENCOUNTER — OFFICE VISIT (OUTPATIENT)
Dept: FAMILY MEDICINE CLINIC | Facility: CLINIC | Age: 53
End: 2023-06-19
Payer: COMMERCIAL

## 2023-06-19 VITALS
SYSTOLIC BLOOD PRESSURE: 122 MMHG | DIASTOLIC BLOOD PRESSURE: 84 MMHG | HEART RATE: 73 BPM | HEIGHT: 72 IN | OXYGEN SATURATION: 97 % | BODY MASS INDEX: 33.32 KG/M2 | TEMPERATURE: 98.4 F | WEIGHT: 246 LBS

## 2023-06-19 DIAGNOSIS — M79.10 MUSCLE ACHE: ICD-10-CM

## 2023-06-19 DIAGNOSIS — R35.0 URINARY FREQUENCY: ICD-10-CM

## 2023-06-19 DIAGNOSIS — R53.83 OTHER FATIGUE: Primary | ICD-10-CM

## 2023-06-19 LAB
BILIRUB BLD-MCNC: NEGATIVE MG/DL
CLARITY, POC: CLEAR
COLOR UR: YELLOW
EXPIRATION DATE: NORMAL
GLUCOSE UR STRIP-MCNC: NEGATIVE MG/DL
KETONES UR QL: NEGATIVE
LEUKOCYTE EST, POC: NEGATIVE
Lab: NORMAL
NITRITE UR-MCNC: NEGATIVE MG/ML
PH UR: 6.5 [PH] (ref 5–8)
PROT UR STRIP-MCNC: NEGATIVE MG/DL
RBC # UR STRIP: NEGATIVE /UL
SP GR UR: 1.02 (ref 1–1.03)
UROBILINOGEN UR QL: NORMAL

## 2023-06-19 PROCEDURE — 81003 URINALYSIS AUTO W/O SCOPE: CPT | Performed by: NURSE PRACTITIONER

## 2023-06-19 PROCEDURE — 99214 OFFICE O/P EST MOD 30 MIN: CPT | Performed by: NURSE PRACTITIONER

## 2023-06-19 RX ORDER — DOXYCYCLINE HYCLATE 100 MG/1
100 CAPSULE ORAL 2 TIMES DAILY
Qty: 20 CAPSULE | Refills: 0 | Status: SHIPPED | OUTPATIENT
Start: 2023-06-19

## 2023-06-19 NOTE — PROGRESS NOTES
"Chief Complaint  Fatigue    Subjective          Damon Blum presents to [facility]  History of Present Illness    Patient states 2 weeks ago he returned from a 460 mile hike in the [facility].  States for 2 weeks he has felt fatigued rundown muscle aches intermittent headaches and increased urinary frequency.  He states he is staying hydrated and drinking plenty of water.  States initially there was some GI issues with nausea vomiting and diarrhea but states all the GI issues have resolved.  No fever no chills.  No ticks that he pulled off that he is aware of.  No respiratory issues.  No rash no lesions.  No dizziness no chest pain no chest pressure no shortness of breath no trouble breathing.    Objective   Vital Signs:   /84   Pulse 73   Temp 98.4 °F (36.9 °C)   Ht 182.9 cm (72\")   Wt 112 kg (246 lb)   SpO2 97%   BMI 33.36 kg/m²     Body mass index is 33.36 kg/m².    Review of Systems   Constitutional:  Positive for fatigue. Negative for chills and fever.   HENT:  Negative for congestion, ear pain, rhinorrhea, sinus pressure, sneezing, sore throat, swollen glands and trouble swallowing.    Eyes:  Negative for visual disturbance.   Respiratory:  Negative for cough, shortness of breath and wheezing.    Cardiovascular: Negative.    Gastrointestinal:  Negative for abdominal distention, abdominal pain, constipation, diarrhea, nausea and vomiting.   Genitourinary:  Positive for frequency. Negative for dysuria, hematuria, testicular pain and urgency.   Musculoskeletal:  Positive for myalgias.   Skin:  Negative for rash, skin lesions and wound.   Neurological:  Negative for dizziness, tremors, weakness, light-headedness, numbness, headache and confusion.   Psychiatric/Behavioral:  Negative for suicidal ideas.      Past History:  Medical History: has a past medical history of Arthritis, Depression, Hypertension, and Kidney stones.   Surgical History: has a past " surgical history that includes Shoulder surgery (Right) and Hip surgery (Right).   Family History: family history includes Alzheimer's disease in his paternal grandfather; Arthritis in his father, maternal grandmother, and paternal grandmother; Asthma in his son; Breast cancer in his sister; Cancer in his mother; Depression in his mother; Hyperlipidemia in his maternal grandmother; Hypertension in his father, maternal grandmother, and paternal grandmother; Lung cancer in his mother; Mental illness in his brother, maternal grandfather, and mother.   Social History: reports that he has never smoked. He has never used smokeless tobacco. He reports that he does not currently use alcohol. He reports that he does not use drugs.    PHQ-2 Depression Screening  Little interest or pleasure in doing things? 0-->not at all   Feeling down, depressed, or hopeless? 0-->not at all   PHQ-2 Total Score 0        PHQ-9 Depression Screening  Little interest or pleasure in doing things? 0-->not at all   Feeling down, depressed, or hopeless? 0-->not at all   Trouble falling or staying asleep, or sleeping too much?     Feeling tired or having little energy?     Poor appetite or overeating?     Feeling bad about yourself - or that you are a failure or have let yourself or your family down?     Trouble concentrating on things, such as reading the newspaper or watching television?     Moving or speaking so slowly that other people could have noticed? Or the opposite - being so fidgety or restless that you have been moving around a lot more than usual?     Thoughts that you would be better off dead, or of hurting yourself in some way?     PHQ-9 Total Score 0   If you checked off any problems, how difficult have these problems made it for you to do your work, take care of things at home, or get along with other people?       PHQ-9 Total Score: 0      Patient screened positive for depression based on a PHQ-9 score of 0 on 6/19/2023. Follow-up  recommendations include:          Current Outpatient Medications:     amLODIPine (NORVASC) 10 MG tablet, TAKE 1 TABLET DAILY, Disp: 90 tablet, Rfl: 1    celecoxib (CeleBREX) 100 MG capsule, Take 1 capsule by mouth 2 (Two) Times a Day As Needed for Mild Pain., Disp: 180 capsule, Rfl: 1    cephalexin (Keflex) 500 MG capsule, Take 1 capsule by mouth 2 (Two) Times a Day., Disp: 20 capsule, Rfl: 0    cetirizine (zyrTEC) 10 MG tablet, Take 1 tablet by mouth Daily., Disp: , Rfl:     coenzyme Q10 100 MG capsule, Take 1 capsule by mouth Daily., Disp: , Rfl:     DULoxetine (CYMBALTA) 60 MG capsule, TAKE 1 CAPSULE DAILY, Disp: 90 capsule, Rfl: 1    GLUTAMINE PO, Take 1 capsule by mouth Daily., Disp: , Rfl:     lamoTRIgine (LaMICtal) 150 MG tablet, TAKE 1 TABLET DAILY, Disp: 90 tablet, Rfl: 1    metoprolol succinate XL (TOPROL-XL) 50 MG 24 hr tablet, TAKE 1 TABLET DAILY, Disp: 90 tablet, Rfl: 0    Pramipexole Dihydrochloride ER 1.5 MG tablet sustained-release 24 hour, TAKE 1 TABLET DAILY, Disp: 90 tablet, Rfl: 0    TADALAFIL PO, Take 1 tablet by mouth Daily., Disp: , Rfl:     testosterone cypionate (Depo-Testosterone) 100 MG/ML solution injection, Inject 1 mL into the appropriate muscle as directed by prescriber Every 14 (Fourteen) Days., Disp: 1 mL, Rfl: 3    Turmeric 500 MG capsule, Take 1 capsule by mouth Daily., Disp: , Rfl:     doxycycline (VIBRAMYCIN) 100 MG capsule, Take 1 capsule by mouth 2 (Two) Times a Day., Disp: 20 capsule, Rfl: 0   (Not in a hospital admission)     Allergies: Patient has no known allergies.    Physical Exam  Constitutional:       Appearance: Normal appearance.   HENT:      Right Ear: Tympanic membrane, ear canal and external ear normal.      Left Ear: Tympanic membrane, ear canal and external ear normal.      Nose: Nose normal.      Mouth/Throat:      Mouth: Mucous membranes are moist.      Pharynx: Oropharynx is clear.   Eyes:      Conjunctiva/sclera: Conjunctivae normal.      Pupils: Pupils are  equal, round, and reactive to light.   Cardiovascular:      Rate and Rhythm: Normal rate and regular rhythm.      Heart sounds: Normal heart sounds.   Pulmonary:      Effort: Pulmonary effort is normal.      Breath sounds: Normal breath sounds.   Abdominal:      General: Abdomen is flat. Bowel sounds are normal. There is no distension.      Palpations: Abdomen is soft.      Tenderness: There is no abdominal tenderness. There is no guarding or rebound.   Musculoskeletal:         General: Normal range of motion.      Right lower leg: No edema.      Left lower leg: No edema.   Skin:     General: Skin is warm.      Findings: No erythema or rash.   Neurological:      General: No focal deficit present.      Mental Status: He is alert and oriented to person, place, and time. Mental status is at baseline.   Psychiatric:         Mood and Affect: Mood normal.         Behavior: Behavior normal.         Thought Content: Thought content normal.         Judgment: Judgment normal.        Result Review :                   Assessment and Plan    Diagnoses and all orders for this visit:    1. Other fatigue (Primary)  -     POC Urinalysis Dipstick, Automated; Future  -     CBC & Differential  -     Comprehensive Metabolic Panel  -     TSH Rfx On Abnormal To Free T4  -     Vitamin D,25-Hydroxy  -     Vitamin B12  -     Iron  -     Ferritin  -     Folate  -     Hemoglobin A1c  -     PSA Total, Reflex To Free  -     Lyme Disease Total Antibody With Reflex to Immunoassay  -     [place] Spotted Fever, IgM  -     [place] Spotted Fever, IgG  -     doxycycline (VIBRAMYCIN) 100 MG capsule; Take 1 capsule by mouth 2 (Two) Times a Day.  Dispense: 20 capsule; Refill: 0    2. Muscle ache  -     Lyme Disease Total Antibody With Reflex to Immunoassay  -     [place] Spotted Fever, IgM  -     [place] Spotted Fever, IgG  -     CK  -     doxycycline (VIBRAMYCIN) 100 MG capsule; Take 1 capsule by mouth 2 (Two) Times a Day.  Dispense: 20  capsule; Refill: 0    3. Urinary frequency  -     POC Urinalysis Dipstick, Automated; Future  -     PSA Total, Reflex To Free  -     doxycycline (VIBRAMYCIN) 100 MG capsule; Take 1 capsule by mouth 2 (Two) Times a Day.  Dispense: 20 capsule; Refill: 0      Labs drawn.  UA completed and negative.  Proper diet and exercise plan discussed and encouraged.  Stay hydrated with water.  Due to him being out in nature for 400+ miles I am going to go ahead and put him on a course of doxycycline to cover possible infections including Lyme and [place] spotted fever.  Again, patient states all GI symptoms have resolved.  Education provided.  Go to ED if worsens.  Risk of meds discussed and understood.  Return to clinic or ED with any issues or concerns.            BMI is >= 30 and <35. (Class 1 Obesity). The following options were offered after discussion;: exercise counseling/recommendations and nutrition counseling/recommendations       Follow Up   Return if symptoms worsen or fail to improve.  Patient was given instructions and counseling regarding his condition or for health maintenance advice. Please see specific information pulled into the AVS if appropriate.     SHERWIN Brown

## 2023-06-23 LAB
25(OH)D3+25(OH)D2 SERPL-MCNC: 49.1 NG/ML (ref 30–100)
ALBUMIN SERPL-MCNC: 3.9 G/DL (ref 3.8–4.9)
ALBUMIN/GLOB SERPL: 1.5 {RATIO} (ref 1.2–2.2)
ALP SERPL-CCNC: 110 IU/L (ref 44–121)
ALT SERPL-CCNC: 50 IU/L (ref 0–44)
AST SERPL-CCNC: 28 IU/L (ref 0–40)
B BURGDOR AB SER-IMP: ABNORMAL
B BURGDOR IGG SERPL QL IA: POSITIVE
B BURGDOR IGG+IGM SER QL IA: POSITIVE
B BURGDOR IGM SERPL QL IA: POSITIVE
BASOPHILS # BLD AUTO: 0 X10E3/UL (ref 0–0.2)
BASOPHILS NFR BLD AUTO: 0 %
BILIRUB SERPL-MCNC: 0.3 MG/DL (ref 0–1.2)
BUN SERPL-MCNC: 27 MG/DL (ref 6–24)
BUN/CREAT SERPL: 20 (ref 9–20)
CALCIUM SERPL-MCNC: 9.1 MG/DL (ref 8.7–10.2)
CHLORIDE SERPL-SCNC: 105 MMOL/L (ref 96–106)
CK SERPL-CCNC: 48 U/L (ref 41–331)
CO2 SERPL-SCNC: 21 MMOL/L (ref 20–29)
CREAT SERPL-MCNC: 1.33 MG/DL (ref 0.76–1.27)
EGFRCR SERPLBLD CKD-EPI 2021: 64 ML/MIN/1.73
EOSINOPHIL # BLD AUTO: 0.5 X10E3/UL (ref 0–0.4)
EOSINOPHIL NFR BLD AUTO: 7 %
ERYTHROCYTE [DISTWIDTH] IN BLOOD BY AUTOMATED COUNT: 12.3 % (ref 11.6–15.4)
FERRITIN SERPL-MCNC: 275 NG/ML (ref 30–400)
FOLATE SERPL-MCNC: 17.4 NG/ML
GLOBULIN SER CALC-MCNC: 2.6 G/DL (ref 1.5–4.5)
GLUCOSE SERPL-MCNC: 95 MG/DL (ref 70–99)
HBA1C MFR BLD: 5.7 % (ref 4.8–5.6)
HCT VFR BLD AUTO: 41.9 % (ref 37.5–51)
HGB BLD-MCNC: 14 G/DL (ref 13–17.7)
IMM GRANULOCYTES # BLD AUTO: 0 X10E3/UL (ref 0–0.1)
IMM GRANULOCYTES NFR BLD AUTO: 0 %
IRON SERPL-MCNC: 60 UG/DL (ref 38–169)
LYMPHOCYTES # BLD AUTO: 0.8 X10E3/UL (ref 0.7–3.1)
LYMPHOCYTES NFR BLD AUTO: 13 %
MCH RBC QN AUTO: 28.9 PG (ref 26.6–33)
MCHC RBC AUTO-ENTMCNC: 33.4 G/DL (ref 31.5–35.7)
MCV RBC AUTO: 87 FL (ref 79–97)
MONOCYTES # BLD AUTO: 0.7 X10E3/UL (ref 0.1–0.9)
MONOCYTES NFR BLD AUTO: 10 %
NEUTROPHILS # BLD AUTO: 4.6 X10E3/UL (ref 1.4–7)
NEUTROPHILS NFR BLD AUTO: 70 %
PLATELET # BLD AUTO: 195 X10E3/UL (ref 150–450)
POTASSIUM SERPL-SCNC: 4.8 MMOL/L (ref 3.5–5.2)
PROT SERPL-MCNC: 6.5 G/DL (ref 6–8.5)
PSA SERPL-MCNC: 0.6 NG/ML (ref 0–4)
R RICKETTSI IGG SER QL IA: POSITIVE
R RICKETTSI IGG TITR SER IF: NORMAL {TITER}
R RICKETTSI IGM SER-ACNC: 0.3 INDEX (ref 0–0.89)
RBC # BLD AUTO: 4.84 X10E6/UL (ref 4.14–5.8)
REFLEX: NORMAL
SODIUM SERPL-SCNC: 139 MMOL/L (ref 134–144)
TSH SERPL DL<=0.005 MIU/L-ACNC: 0.84 UIU/ML (ref 0.45–4.5)
VIT B12 SERPL-MCNC: 653 PG/ML (ref 232–1245)
WBC # BLD AUTO: 6.6 X10E3/UL (ref 3.4–10.8)

## 2023-08-06 ENCOUNTER — PATIENT MESSAGE (OUTPATIENT)
Dept: FAMILY MEDICINE CLINIC | Facility: CLINIC | Age: 53
End: 2023-08-06
Payer: COMMERCIAL

## 2023-08-09 RX ORDER — LITHIUM CARBONATE 300 MG
300 TABLET ORAL 3 TIMES DAILY
Qty: 270 TABLET | Refills: 1 | Status: SHIPPED | OUTPATIENT
Start: 2023-08-09

## 2023-09-06 RX ORDER — AMLODIPINE BESYLATE 10 MG/1
TABLET ORAL
Qty: 90 TABLET | Refills: 1 | Status: SHIPPED | OUTPATIENT
Start: 2023-09-06

## 2023-09-06 RX ORDER — METOPROLOL SUCCINATE 50 MG/1
TABLET, EXTENDED RELEASE ORAL
Qty: 90 TABLET | Refills: 1 | Status: SHIPPED | OUTPATIENT
Start: 2023-09-06

## 2024-01-24 RX ORDER — AMLODIPINE BESYLATE 10 MG/1
TABLET ORAL
Qty: 90 TABLET | Refills: 0 | Status: SHIPPED | OUTPATIENT
Start: 2024-01-24

## 2024-01-24 RX ORDER — DULOXETIN HYDROCHLORIDE 60 MG/1
CAPSULE, DELAYED RELEASE ORAL
Qty: 90 CAPSULE | Refills: 0 | Status: SHIPPED | OUTPATIENT
Start: 2024-01-24

## 2024-01-24 RX ORDER — METOPROLOL SUCCINATE 50 MG/1
TABLET, EXTENDED RELEASE ORAL
Qty: 90 TABLET | Refills: 0 | Status: SHIPPED | OUTPATIENT
Start: 2024-01-24

## 2024-01-24 RX ORDER — LAMOTRIGINE 150 MG/1
TABLET ORAL
Qty: 90 TABLET | Refills: 0 | Status: SHIPPED | OUTPATIENT
Start: 2024-01-24

## 2024-01-24 RX ORDER — PRAMIPEXOLE 1.5 MG/1
TABLET, EXTENDED RELEASE ORAL
Qty: 90 TABLET | Refills: 0 | Status: SHIPPED | OUTPATIENT
Start: 2024-01-24

## 2024-01-24 NOTE — TELEPHONE ENCOUNTER
Pt is due for med recheck/annual physical appt, please call pt and schedule appt. Medications sent x90 days until seen in office.

## 2024-02-08 ENCOUNTER — OFFICE VISIT (OUTPATIENT)
Dept: FAMILY MEDICINE CLINIC | Facility: CLINIC | Age: 54
End: 2024-02-08
Payer: COMMERCIAL

## 2024-02-08 VITALS
TEMPERATURE: 98.2 F | HEIGHT: 72 IN | SYSTOLIC BLOOD PRESSURE: 132 MMHG | OXYGEN SATURATION: 100 % | BODY MASS INDEX: 37.38 KG/M2 | HEART RATE: 75 BPM | WEIGHT: 276 LBS | DIASTOLIC BLOOD PRESSURE: 84 MMHG

## 2024-02-08 DIAGNOSIS — J02.9 SORE THROAT: ICD-10-CM

## 2024-02-08 DIAGNOSIS — J02.0 STREP THROAT: Primary | ICD-10-CM

## 2024-02-08 LAB
EXPIRATION DATE: ABNORMAL
EXPIRATION DATE: NORMAL
FLUAV AG UPPER RESP QL IA.RAPID: NOT DETECTED
FLUBV AG UPPER RESP QL IA.RAPID: NOT DETECTED
INTERNAL CONTROL: ABNORMAL
INTERNAL CONTROL: NORMAL
Lab: ABNORMAL
Lab: NORMAL
S PYO AG THROAT QL: POSITIVE
SARS-COV-2 AG UPPER RESP QL IA.RAPID: NOT DETECTED

## 2024-02-08 RX ORDER — SENNOSIDES 8.6 MG
650 CAPSULE ORAL 3 TIMES DAILY PRN
COMMUNITY
Start: 2023-10-30

## 2024-02-08 RX ORDER — AMOXICILLIN 500 MG/1
500 TABLET, FILM COATED ORAL 2 TIMES DAILY
Qty: 20 TABLET | Refills: 0 | Status: SHIPPED | OUTPATIENT
Start: 2024-02-08 | End: 2024-02-18

## 2024-02-08 RX ORDER — IBUPROFEN 400 MG/1
TABLET ORAL EVERY 6 HOURS PRN
COMMUNITY
Start: 2023-10-30

## 2024-02-08 NOTE — PROGRESS NOTES
"Chief Complaint  Sore Throat and Chills    Subjective          History of Present Illness  Damon Blum is here today with sore throat and chills  Patient states that starting 4 days ago he felt ill the next day felt much worse and then yesterday morning he knew he was sick.  He states that he has had a sore throat as well as chills on and off.  He states has had some achiness.  He denies any headache or nausea but has had some loose stool.  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  He states that he had strep throat about 6 months ago.    Objective   Vital Signs:   /84   Pulse 75   Temp 98.2 °F (36.8 °C) (Oral)   Ht 182.9 cm (72\")   Wt 125 kg (276 lb)   SpO2 100%   BMI 37.43 kg/m²     Body mass index is 37.43 kg/m².         Review of Systems      Current Outpatient Medications:     acetaminophen (TYLENOL) 650 MG 8 hr tablet, Take 1 tablet by mouth 3 (Three) Times a Day As Needed for Mild Pain. for pain, Disp: , Rfl:     amLODIPine (NORVASC) 10 MG tablet, TAKE 1 TABLET DAILY, Disp: 90 tablet, Rfl: 0    celecoxib (CeleBREX) 100 MG capsule, Take 1 capsule by mouth 2 (Two) Times a Day As Needed for Mild Pain., Disp: 180 capsule, Rfl: 1    cetirizine (zyrTEC) 10 MG tablet, Take 1 tablet by mouth Daily., Disp: , Rfl:     coenzyme Q10 100 MG capsule, Take 1 capsule by mouth Daily., Disp: , Rfl:     DULoxetine (CYMBALTA) 60 MG capsule, TAKE 1 CAPSULE DAILY, Disp: 90 capsule, Rfl: 0    GLUTAMINE PO, Take 1 capsule by mouth Daily., Disp: , Rfl:     ibuprofen (ADVIL,MOTRIN) 400 MG tablet, Every 6 (Six) Hours As Needed for Mild Pain., Disp: , Rfl:     lamoTRIgine (LaMICtal) 150 MG tablet, TAKE 1 TABLET DAILY, Disp: 90 tablet, Rfl: 0    metoprolol succinate XL (TOPROL-XL) 50 MG 24 hr tablet, TAKE 1 TABLET DAILY, Disp: 90 tablet, Rfl: 0    Pramipexole Dihydrochloride ER 1.5 MG tablet sustained-release 24 hour, TAKE 1 TABLET DAILY, Disp: 90 tablet, Rfl: 0    testosterone " cypionate (Depo-Testosterone) 100 MG/ML solution injection, Inject 1 mL into the appropriate muscle as directed by prescriber Every 14 (Fourteen) Days., Disp: 1 mL, Rfl: 3    Turmeric 500 MG capsule, Take 1 capsule by mouth Daily., Disp: , Rfl:     amoxicillin (AMOXIL) 500 MG tablet, Take 1 tablet by mouth 2 (Two) Times a Day for 10 days., Disp: 20 tablet, Rfl: 0    lithium 300 MG tablet, Take 1 tablet by mouth 3 (Three) Times a Day., Disp: 270 tablet, Rfl: 1    predniSONE (DELTASONE) 20 MG tablet, 3 po qd x3 d then 2 po qd x3d then 1 po qd x3d, Disp: 18 tablet, Rfl: 0    TADALAFIL PO, Take 1 tablet by mouth Daily. (Patient not taking: Reported on 2/8/2024), Disp: , Rfl:     Allergies: Patient has no known allergies.    Physical Exam  Vitals and nursing note reviewed.   Constitutional:       General: He is not in acute distress.     Appearance: Normal appearance. He is not ill-appearing, toxic-appearing or diaphoretic.   HENT:      Head: Normocephalic and atraumatic.      Right Ear: Tympanic membrane, ear canal and external ear normal.      Left Ear: Tympanic membrane, ear canal and external ear normal.      Nose: Congestion present.      Mouth/Throat:      Mouth: Mucous membranes are moist.      Pharynx: Posterior oropharyngeal erythema present.   Eyes:      Pupils: Pupils are equal, round, and reactive to light.   Cardiovascular:      Rate and Rhythm: Normal rate and regular rhythm.      Heart sounds: Normal heart sounds.   Pulmonary:      Effort: Pulmonary effort is normal.      Breath sounds: Normal breath sounds.   Neurological:      General: No focal deficit present.      Mental Status: He is alert.   Psychiatric:         Mood and Affect: Mood normal.          Result Review :                   Assessment and Plan    Diagnoses and all orders for this visit:    1. Strep throat (Primary)  -     amoxicillin (AMOXIL) 500 MG tablet; Take 1 tablet by mouth 2 (Two) Times a Day for 10 days.  Dispense: 20 tablet;  Refill: 0  Discussed with patient that his COVID and flu test today are negative but his strep a is positive.  Will place him on amoxicillin.  If he finds that his symptoms worsen he is to call our office.  2. Sore throat  -     POCT SARS-CoV-2 + Flu Antigen LESLYE  -     POC Rapid Strep A        Follow Up   No follow-ups on file.  Patient was given instructions and counseling regarding his condition or for health maintenance advice. Please see specific information pulled into the AVS if appropriate.     RJ Lomax  02/08/2024

## 2024-03-08 ENCOUNTER — OFFICE VISIT (OUTPATIENT)
Dept: FAMILY MEDICINE CLINIC | Facility: CLINIC | Age: 54
End: 2024-03-08
Payer: COMMERCIAL

## 2024-03-08 VITALS
SYSTOLIC BLOOD PRESSURE: 138 MMHG | HEART RATE: 81 BPM | WEIGHT: 278 LBS | BODY MASS INDEX: 37.65 KG/M2 | DIASTOLIC BLOOD PRESSURE: 80 MMHG | OXYGEN SATURATION: 98 % | HEIGHT: 72 IN

## 2024-03-08 DIAGNOSIS — F31.60 BIPOLAR AFFECTIVE DISORDER, CURRENT EPISODE MIXED, CURRENT EPISODE SEVERITY UNSPECIFIED: ICD-10-CM

## 2024-03-08 DIAGNOSIS — G25.81 RESTLESS LEG SYNDROME: ICD-10-CM

## 2024-03-08 DIAGNOSIS — I10 ESSENTIAL HYPERTENSION: ICD-10-CM

## 2024-03-08 DIAGNOSIS — R53.83 OTHER FATIGUE: ICD-10-CM

## 2024-03-08 DIAGNOSIS — Z00.00 ROUTINE GENERAL MEDICAL EXAMINATION AT A HEALTH CARE FACILITY: Primary | ICD-10-CM

## 2024-03-08 DIAGNOSIS — E66.01 MORBID (SEVERE) OBESITY DUE TO EXCESS CALORIES: ICD-10-CM

## 2024-03-08 DIAGNOSIS — Z12.5 PROSTATE CANCER SCREENING: ICD-10-CM

## 2024-03-08 RX ORDER — DULOXETIN HYDROCHLORIDE 60 MG/1
60 CAPSULE, DELAYED RELEASE ORAL DAILY
Qty: 90 CAPSULE | Refills: 3 | Status: SHIPPED | OUTPATIENT
Start: 2024-03-08

## 2024-03-08 RX ORDER — SEMAGLUTIDE 0.25 MG/.5ML
0.25 INJECTION, SOLUTION SUBCUTANEOUS WEEKLY
Qty: 2 ML | Refills: 1 | Status: SHIPPED | OUTPATIENT
Start: 2024-03-08

## 2024-03-08 RX ORDER — AMLODIPINE BESYLATE 10 MG/1
10 TABLET ORAL DAILY
Qty: 90 TABLET | Refills: 3 | Status: SHIPPED | OUTPATIENT
Start: 2024-03-08

## 2024-03-08 RX ORDER — LAMOTRIGINE 150 MG/1
150 TABLET ORAL DAILY
Qty: 90 TABLET | Refills: 3 | Status: SHIPPED | OUTPATIENT
Start: 2024-03-08

## 2024-03-08 RX ORDER — METOPROLOL SUCCINATE 50 MG/1
50 TABLET, EXTENDED RELEASE ORAL DAILY
Qty: 90 TABLET | Refills: 3 | Status: SHIPPED | OUTPATIENT
Start: 2024-03-08

## 2024-03-08 RX ORDER — PRAMIPEXOLE 1.5 MG/1
1 TABLET, EXTENDED RELEASE ORAL DAILY
Qty: 90 TABLET | Refills: 3 | Status: SHIPPED | OUTPATIENT
Start: 2024-03-08

## 2024-03-08 NOTE — PROGRESS NOTES
Male Physical Note      Date:  2024   Patient Name: Damon Blum  : 1970   MRN: [id number]     Chief Complaint:    Chief Complaint   Patient presents with    Annual Exam       History of Present Illness: Damon Blum is a 53 y.o. male who is here today for their annual health maintenance and physical.  Appropriate health maintenance discussed.  Appropriate vaccines and cancer screenings discussed.  More issues lately with fatigue.  Also has had a lot of weight gain.  Struggling with diet.  Blood pressure actually stable.  Patient due for blood work today.  Discussed overall cancer screenings.  Vaccines as well.      Subjective      Review of Systems:   Review of Systems   Constitutional:  Negative for fatigue and fever.   HENT:  Negative for congestion and ear pain.    Respiratory:  Negative for apnea, cough, chest tightness and shortness of breath.    Cardiovascular:  Negative for chest pain.   Gastrointestinal:  Negative for abdominal pain, constipation, diarrhea and nausea.   Musculoskeletal:  Negative for arthralgias.   Psychiatric/Behavioral:  Negative for depressed mood and stress.        Past Medical History:   Past Medical History:   Diagnosis Date    Arthritis     Depression     Hypertension     Kidney stones     PASSED ON OWN       Past Surgical History:   Past Surgical History:   Procedure Laterality Date    COLONOSCOPY  12/15/2022    HERNIA REPAIR      HIP SURGERY Right     JOINT REPLACEMENT  02/10/2020    SHOULDER SURGERY Right        Family History:   Family History   Problem Relation Age of Onset    Cancer Mother     Mental illness Mother     Lung cancer Mother     Depression Mother     Arthritis Father     Hypertension Father     Breast cancer Sister     Mental illness Brother     Arthritis Maternal Grandmother     Hypertension Maternal Grandmother     Hyperlipidemia Maternal Grandmother     Mental illness Maternal Grandfather     Arthritis Paternal Grandmother      Hypertension Paternal Grandmother     Alzheimer's disease Paternal Grandfather     Asthma Son        Social History:   Social History     Socioeconomic History    Marital status:    Tobacco Use    Smoking status: Never    Smokeless tobacco: Never   Vaping Use    Vaping status: Never Used   Substance and Sexual Activity    Alcohol use: Not Currently     Comment: occa    Drug use: Never    Sexual activity: Not Currently     Partners: Female       Medications:     Current Outpatient Medications:     amLODIPine (NORVASC) 10 MG tablet, Take 1 tablet by mouth Daily., Disp: 90 tablet, Rfl: 3    DULoxetine (CYMBALTA) 60 MG capsule, Take 1 capsule by mouth Daily., Disp: 90 capsule, Rfl: 3    lamoTRIgine (LaMICtal) 150 MG tablet, Take 1 tablet by mouth Daily., Disp: 90 tablet, Rfl: 3    metoprolol succinate XL (TOPROL-XL) 50 MG 24 hr tablet, Take 1 tablet by mouth Daily., Disp: 90 tablet, Rfl: 3    Pramipexole Dihydrochloride ER 1.5 MG tablet sustained-release 24 hour, Take 1 tablet by mouth Daily., Disp: 90 tablet, Rfl: 3    acetaminophen (TYLENOL) 650 MG 8 hr tablet, Take 1 tablet by mouth 3 (Three) Times a Day As Needed for Mild Pain. for pain, Disp: , Rfl:     cetirizine (zyrTEC) 10 MG tablet, Take 1 tablet by mouth Daily., Disp: , Rfl:     ibuprofen (ADVIL,MOTRIN) 400 MG tablet, Every 6 (Six) Hours As Needed for Mild Pain., Disp: , Rfl:     Semaglutide-Weight Management (Wegovy) 0.25 MG/0.5ML solution auto-injector, Inject 0.5 mL under the skin into the appropriate area as directed 1 (One) Time Per Week., Disp: 2 mL, Rfl: 1    testosterone cypionate (Depo-Testosterone) 100 MG/ML solution injection, Inject 1 mL into the appropriate muscle as directed by prescriber Every 14 (Fourteen) Days., Disp: 1 mL, Rfl: 3    Turmeric 500 MG capsule, Take 1 capsule by mouth Daily., Disp: , Rfl:     Allergies:   No Known Allergies    Immunization History   Administered Date(s) Administered    Fluzone (or Fluarix & Flulaval for  "VFC) >6mos 11/05/2020, 11/07/2022    Influenza, Unspecified 10/18/2021, 11/07/2022    Tdap 09/17/2013     Colorectal Screening:     Last Completed Colonoscopy            COLORECTAL CANCER SCREENING (COLONOSCOPY - Every 10 Years) Next due on 1/1/2031 01/01/2021  COLONOSCOPY (Done)    12/30/2009  Colonoscopy                     Diet/Physical activity: Appropriate diet and exercise discussed.    Depression: PHQ-2 Depression Screening  Little interest or pleasure in doing things? 0-->not at all   Feeling down, depressed, or hopeless? 0-->not at all   PHQ-2 Total Score 0        Objective     Physical Exam:  Vital Signs:   Vitals:    03/08/24 0806   BP: 138/80   Pulse: 81   SpO2: 98%   Weight: 126 kg (278 lb)   Height: 182.9 cm (72\")     Body mass index is 37.7 kg/m².     Physical Exam  Vitals and nursing note reviewed.   Constitutional:       General: He is not in acute distress.     Appearance: Normal appearance. He is not ill-appearing.   HENT:      Head: Normocephalic and atraumatic.      Right Ear: Tympanic membrane and ear canal normal.      Left Ear: Tympanic membrane and ear canal normal.      Nose: Nose normal.   Cardiovascular:      Rate and Rhythm: Normal rate and regular rhythm.      Heart sounds: Normal heart sounds.   Pulmonary:      Effort: Pulmonary effort is normal.      Breath sounds: Normal breath sounds.   Neurological:      Mental Status: He is alert and oriented to person, place, and time. Mental status is at baseline.   Psychiatric:         Mood and Affect: Mood normal.         Procedures    Assessment / Plan      Assessment/Plan:   Diagnoses and all orders for this visit:    1. Routine general medical examination at a health care facility (Primary)  -     CBC Auto Differential  -     Comprehensive Metabolic Panel  -     Lipid Panel  -     PSA Screen  -     TSH    2. Prostate cancer screening  -     PSA Screen    3. Other fatigue  -     Cancel: Testosterone; Future  -     Testosterone    4. " Bipolar affective disorder, current episode mixed, current episode severity unspecified  -     lamoTRIgine (LaMICtal) 150 MG tablet; Take 1 tablet by mouth Daily.  Dispense: 90 tablet; Refill: 3  -     DULoxetine (CYMBALTA) 60 MG capsule; Take 1 capsule by mouth Daily.  Dispense: 90 capsule; Refill: 3    5. Morbid (severe) obesity due to excess calories  -     Semaglutide-Weight Management (Wegovy) 0.25 MG/0.5ML solution auto-injector; Inject 0.5 mL under the skin into the appropriate area as directed 1 (One) Time Per Week.  Dispense: 2 mL; Refill: 1    6. Restless leg syndrome  -     Pramipexole Dihydrochloride ER 1.5 MG tablet sustained-release 24 hour; Take 1 tablet by mouth Daily.  Dispense: 90 tablet; Refill: 3    7. Essential hypertension  -     metoprolol succinate XL (TOPROL-XL) 50 MG 24 hr tablet; Take 1 tablet by mouth Daily.  Dispense: 90 tablet; Refill: 3  -     amLODIPine (NORVASC) 10 MG tablet; Take 1 tablet by mouth Daily.  Dispense: 90 tablet; Refill: 3    Other orders  -     Tdap Vaccine => 6yo IM (BOOSTRIX)  -     Shingrix Vaccine         Appropriate diet and physical activity discussed.  Appropriate vaccines and cancer screenings discussed.  Refilled all chronic medications for his medical issues.  Gave tetanus shot and Shingrix today.  Up-to-date on colonoscopy.  PSA today.  Trial of medication for helping with his diet.  Also checking labs for fatigue.      Follow Up:   No follow-ups on file.    Healthcare Maintenance:   Counseling provided on appropriate health maintenance  Damno Blum voices understanding and acceptance of this advice and will call back with any further questions or concerns. AVS with preventive healthcare tips printed for patient.     Walter Arrington MD  [facility]

## 2024-03-09 LAB
ALBUMIN SERPL-MCNC: 4.2 G/DL (ref 3.8–4.9)
ALBUMIN/GLOB SERPL: 1.9 {RATIO} (ref 1.2–2.2)
ALP SERPL-CCNC: 83 IU/L (ref 44–121)
ALT SERPL-CCNC: 32 IU/L (ref 0–44)
AST SERPL-CCNC: 31 IU/L (ref 0–40)
BASOPHILS # BLD AUTO: 0 X10E3/UL (ref 0–0.2)
BASOPHILS NFR BLD AUTO: 1 %
BILIRUB SERPL-MCNC: 0.4 MG/DL (ref 0–1.2)
BUN SERPL-MCNC: 27 MG/DL (ref 6–24)
BUN/CREAT SERPL: 18 (ref 9–20)
CALCIUM SERPL-MCNC: 9.5 MG/DL (ref 8.7–10.2)
CHLORIDE SERPL-SCNC: 105 MMOL/L (ref 96–106)
CHOLEST SERPL-MCNC: 174 MG/DL (ref 100–199)
CO2 SERPL-SCNC: 20 MMOL/L (ref 20–29)
CREAT SERPL-MCNC: 1.47 MG/DL (ref 0.76–1.27)
EGFRCR SERPLBLD CKD-EPI 2021: 56 ML/MIN/1.73
EOSINOPHIL # BLD AUTO: 0.4 X10E3/UL (ref 0–0.4)
EOSINOPHIL NFR BLD AUTO: 9 %
ERYTHROCYTE [DISTWIDTH] IN BLOOD BY AUTOMATED COUNT: 13.2 % (ref 11.6–15.4)
GLOBULIN SER CALC-MCNC: 2.2 G/DL (ref 1.5–4.5)
GLUCOSE SERPL-MCNC: 94 MG/DL (ref 70–99)
HCT VFR BLD AUTO: 45.8 % (ref 37.5–51)
HDLC SERPL-MCNC: 56 MG/DL
HGB BLD-MCNC: 15.2 G/DL (ref 13–17.7)
IMM GRANULOCYTES # BLD AUTO: 0 X10E3/UL (ref 0–0.1)
IMM GRANULOCYTES NFR BLD AUTO: 0 %
LDLC SERPL CALC-MCNC: 99 MG/DL (ref 0–99)
LYMPHOCYTES # BLD AUTO: 1.3 X10E3/UL (ref 0.7–3.1)
LYMPHOCYTES NFR BLD AUTO: 27 %
MCH RBC QN AUTO: 29 PG (ref 26.6–33)
MCHC RBC AUTO-ENTMCNC: 33.2 G/DL (ref 31.5–35.7)
MCV RBC AUTO: 87 FL (ref 79–97)
MONOCYTES # BLD AUTO: 0.5 X10E3/UL (ref 0.1–0.9)
MONOCYTES NFR BLD AUTO: 11 %
NEUTROPHILS # BLD AUTO: 2.5 X10E3/UL (ref 1.4–7)
NEUTROPHILS NFR BLD AUTO: 52 %
PLATELET # BLD AUTO: 170 X10E3/UL (ref 150–450)
POTASSIUM SERPL-SCNC: 4.5 MMOL/L (ref 3.5–5.2)
PROT SERPL-MCNC: 6.4 G/DL (ref 6–8.5)
PSA SERPL-MCNC: 0.7 NG/ML (ref 0–4)
RBC # BLD AUTO: 5.25 X10E6/UL (ref 4.14–5.8)
SODIUM SERPL-SCNC: 141 MMOL/L (ref 134–144)
TESTOST SERPL-MCNC: 360 NG/DL (ref 264–916)
TRIGL SERPL-MCNC: 103 MG/DL (ref 0–149)
TSH SERPL DL<=0.005 MIU/L-ACNC: 0.89 UIU/ML (ref 0.45–4.5)
VLDLC SERPL CALC-MCNC: 19 MG/DL (ref 5–40)
WBC # BLD AUTO: 4.7 X10E3/UL (ref 3.4–10.8)

## 2024-03-30 LAB — TESTOST SERPL-MCNC: 348 NG/DL (ref 264–916)

## 2024-04-01 ENCOUNTER — PATIENT MESSAGE (OUTPATIENT)
Dept: FAMILY MEDICINE CLINIC | Facility: CLINIC | Age: 54
End: 2024-04-01
Payer: COMMERCIAL

## 2024-04-14 NOTE — TELEPHONE ENCOUNTER
From: Damon Blum  To: Walter Arrington  Sent: 4/1/2024 8:20 AM EDT  Subject: LDH level     Good morning Dr. Arrington. I recently had a physical and blood test for a new life insurance policy and my LDH is elevated to 262. I read that it is an indicator of tissue damage and it's got me a bit concerned. Could this be related to my kidney problem, or something else? Also, during my visit with you I mentioned that my arthritis has been extremely noticeable as of the past few months. I've been thinking it was just a lingering symptom of Lyme. Could this be what's got the LDH level elevated?  Also, I've had no success in obtaining the weight loss medication you prescribed. It's not available anywhere and is on backorder indefinitely. My insurance will not cover any substitute meds. I'm anxious to get started with the therapy but not sure what to do next.  Thanks so much for your time.  Enrico Blum

## 2024-06-05 ENCOUNTER — PATIENT MESSAGE (OUTPATIENT)
Dept: FAMILY MEDICINE CLINIC | Facility: CLINIC | Age: 54
End: 2024-06-05
Payer: COMMERCIAL

## 2024-06-10 NOTE — TELEPHONE ENCOUNTER
From: Damon Blum  To: Walter Arrington  Sent: 6/5/2024 9:52 AM EDT  Subject: Weight loss medication     Hi Doctor Murphy,    I've been taking the new meds you prescribed for weight loss and it seemed to be working for the first 10 days, but I'm finding it has been doing little to curb my appetite since then. I have been watching my blood pressure and it's hold at about 130/79. I have an appointment scheduled with you on 6/17, but I will have run out by then. Is there an extended release version of the media, or would a slightly higher dose be helpful?    Thank you.  Enrico Blum

## 2024-06-17 ENCOUNTER — OFFICE VISIT (OUTPATIENT)
Dept: FAMILY MEDICINE CLINIC | Facility: CLINIC | Age: 54
End: 2024-06-17
Payer: COMMERCIAL

## 2024-06-17 VITALS
OXYGEN SATURATION: 98 % | HEIGHT: 72 IN | DIASTOLIC BLOOD PRESSURE: 78 MMHG | WEIGHT: 271 LBS | HEART RATE: 77 BPM | SYSTOLIC BLOOD PRESSURE: 138 MMHG | BODY MASS INDEX: 36.7 KG/M2

## 2024-06-17 DIAGNOSIS — E29.9 TESTICULAR DYSFUNCTION: ICD-10-CM

## 2024-06-17 DIAGNOSIS — G25.81 RESTLESS LEG SYNDROME: ICD-10-CM

## 2024-06-17 DIAGNOSIS — F31.60 BIPOLAR AFFECTIVE DISORDER, CURRENT EPISODE MIXED, CURRENT EPISODE SEVERITY UNSPECIFIED: ICD-10-CM

## 2024-06-17 DIAGNOSIS — E66.01 MORBID (SEVERE) OBESITY DUE TO EXCESS CALORIES: Primary | ICD-10-CM

## 2024-06-17 DIAGNOSIS — I10 ESSENTIAL HYPERTENSION: ICD-10-CM

## 2024-06-17 PROCEDURE — 99214 OFFICE O/P EST MOD 30 MIN: CPT | Performed by: FAMILY MEDICINE

## 2024-06-17 PROCEDURE — 90471 IMMUNIZATION ADMIN: CPT | Performed by: FAMILY MEDICINE

## 2024-06-17 PROCEDURE — 90750 HZV VACC RECOMBINANT IM: CPT | Performed by: FAMILY MEDICINE

## 2024-06-17 RX ORDER — METOPROLOL SUCCINATE 50 MG/1
50 TABLET, EXTENDED RELEASE ORAL DAILY
Qty: 90 TABLET | Refills: 3 | Status: SHIPPED | OUTPATIENT
Start: 2024-06-17

## 2024-06-17 RX ORDER — AMLODIPINE BESYLATE 10 MG/1
10 TABLET ORAL DAILY
Qty: 90 TABLET | Refills: 3 | Status: SHIPPED | OUTPATIENT
Start: 2024-06-17

## 2024-06-17 RX ORDER — LAMOTRIGINE 150 MG/1
150 TABLET ORAL DAILY
Qty: 90 TABLET | Refills: 3 | Status: SHIPPED | OUTPATIENT
Start: 2024-06-17

## 2024-06-17 RX ORDER — DULOXETIN HYDROCHLORIDE 60 MG/1
60 CAPSULE, DELAYED RELEASE ORAL DAILY
Qty: 90 CAPSULE | Refills: 3 | Status: SHIPPED | OUTPATIENT
Start: 2024-06-17

## 2024-06-17 RX ORDER — TESTOSTERONE CYPIONATE 1000 MG/10ML
100 INJECTION, SOLUTION INTRAMUSCULAR
Qty: 1 ML | Refills: 3 | Status: SHIPPED | OUTPATIENT
Start: 2024-06-17

## 2024-06-17 RX ORDER — PRAMIPEXOLE 1.5 MG/1
1 TABLET, EXTENDED RELEASE ORAL DAILY
Qty: 90 TABLET | Refills: 3 | Status: SHIPPED | OUTPATIENT
Start: 2024-06-17

## 2024-06-17 RX ORDER — PRAMIPEXOLE 1.5 MG/1
1 TABLET, EXTENDED RELEASE ORAL DAILY
Qty: 30 TABLET | Refills: 0 | Status: SHIPPED | OUTPATIENT
Start: 2024-06-17 | End: 2024-06-17 | Stop reason: SDUPTHER

## 2024-06-17 RX ORDER — SEMAGLUTIDE 0.25 MG/.5ML
0.25 INJECTION, SOLUTION SUBCUTANEOUS WEEKLY
Qty: 2 ML | Refills: 1 | Status: SHIPPED | OUTPATIENT
Start: 2024-06-17

## 2024-06-17 NOTE — LETTER
[facility]  Vaccine Consent Form    Patient Name:  Damon Blum  Patient :  1970     Vaccine(s) Ordered    Shingrix Vaccine        Screening Checklist  The following questions should be completed prior to vaccination. If you answer “yes” to any question, it does not necessarily mean you should not be vaccinated. It just means we may need to clarify or ask more questions. If a question is unclear, please ask your healthcare provider to explain it.    Yes No   Any fever or moderate to severe illness today (mild illness and/or antibiotic treatment are not contraindications)?     Do you have a history of a serious reaction to any previous vaccinations, such as anaphylaxis, encephalopathy within 7 days, Guillain-[place] syndrome within 6 weeks, seizure?     Have you received any live vaccine(s) (e.g MMR, JAYJAY) or any other vaccines in the last month (to ensure duplicate doses aren't given)?     Do you have an anaphylactic allergy to latex (DTaP, DTaP-IPV, Hep A, Hep B, MenB, RV, Td, Tdap), baker’s yeast (Hep B, HPV), polysorbates (RSV, nirsevimab, PCV 20, Rotavirrus, Tdap, Shingrix), or gelatin (JAYJAY, MMR)?     Do you have an anaphylactic allergy to neomycin (Rabies, JAYJAY, MMR, IPV, Hep A), polymyxin B (IPV), or streptomycin (IPV)?      Any cancer, leukemia, AIDS, or other immune system disorder? (JAYJAY, MMR, RV)     Do you have a parent, brother, or sister with an immune system problem (if immune competence of vaccine recipient clinically verified, can proceed)? (MMR, JAYJAY)     Any recent steroid treatments for >2 weeks, chemotherapy, or radiation treatment? (JAYJAY, MMR)     Have you received antibody-containing blood transfusions or IVIG in the past 11 months (recommended interval is dependent on product)? (MMR, JAYJAY)     Have you taken antiviral drugs (acyclovir, famciclovir, valacyclovir for JAYJAY) in the last 24 or 48 hours, respectively?      Are you pregnant or planning to become pregnant within 1 month?  "(JAYJAY, MMR, HPV, IPV, MenB, Abrexvy; For Hep B- refer to Engerix-B; For RSV - Abrysvo is indicated for 32-36 weeks of pregnancy from September to January)     For infants, have you ever been told your child has had intussusception or a medical emergency involving obstruction of the intestine (Rotavirus)? If not for an infant, can skip this question.         *Ordering Physicians/APC should be consulted if \"yes\" is checked by the patient or guardian above.  I have received, read, and understand the Vaccine Information Statement (VIS) for each vaccine ordered.  I have considered my or my child's health status as well as the health status of my close contacts.  I have taken the opportunity to discuss my vaccine questions with my or my child's health care provider.   I have requested that the ordered vaccine(s) be given to me or my child.  I understand the benefits and risks of the vaccines.  I understand that I should remain in the clinic for 15 minutes after receiving the vaccine(s).  _________________________________________________________  Signature of Patient or Parent/Legal Guardian ____________________  Date     "

## 2024-06-17 NOTE — PROGRESS NOTES
Follow Up Office Visit      Date of Visit:  2024   Patient Name: Damon Blum  : 1970   MRN: [id number]     Chief Complaint:    Chief Complaint   Patient presents with    Weight Check       History of Present Illness: Damon Blum is a 53 y.o. male who is here today for follow up.    History of Present Illness  The patient is a 53-year-old male presenting for reevaluation on his weight. We have been using medication for weight loss. He is just for follow-up on his 20 weight score.    The patient reports experiencing fatigue, which he attributes to his weight loss medication. He experiences persistent dizziness, headaches, and inability to keep his eyes open. The initial 10-day period was effective, resulting in a weight loss of 6 pounds. However, the medication has since lost its efficacy, leading to feelings of exhaustion. He has recently transitioned to a new insurance provider. He also reports experiencing fatigue, shakiness, and weakness.      Subjective      Review of Systems:   Review of Systems    Past Medical History:   Past Medical History:   Diagnosis Date    Arthritis     Depression     Hypertension     Kidney stones     PASSED ON OWN       Past Surgical History:   Past Surgical History:   Procedure Laterality Date    COLONOSCOPY  12/15/2022    HERNIA REPAIR      HIP SURGERY Right     JOINT REPLACEMENT  02/10/2020    SHOULDER SURGERY Right        Family History:   Family History   Problem Relation Age of Onset    Cancer Mother     Mental illness Mother     Lung cancer Mother     Depression Mother     Arthritis Father     Hypertension Father     Breast cancer Sister     Mental illness Brother     Arthritis Maternal Grandmother     Hypertension Maternal Grandmother     Hyperlipidemia Maternal Grandmother     Mental illness Maternal Grandfather     Arthritis Paternal Grandmother     Hypertension Paternal Grandmother     Alzheimer's disease Paternal Grandfather     Asthma  "Son        Social History:   Social History     Socioeconomic History    Marital status:    Tobacco Use    Smoking status: Never    Smokeless tobacco: Never   Vaping Use    Vaping status: Never Used   Substance and Sexual Activity    Alcohol use: Not Currently     Comment: occa    Drug use: Never    Sexual activity: Not Currently     Partners: Female       Medications:     Current Outpatient Medications:     amLODIPine (NORVASC) 10 MG tablet, Take 1 tablet by mouth Daily., Disp: 90 tablet, Rfl: 3    DULoxetine (CYMBALTA) 60 MG capsule, Take 1 capsule by mouth Daily., Disp: 90 capsule, Rfl: 3    lamoTRIgine (LaMICtal) 150 MG tablet, Take 1 tablet by mouth Daily., Disp: 90 tablet, Rfl: 3    metoprolol succinate XL (TOPROL-XL) 50 MG 24 hr tablet, Take 1 tablet by mouth Daily., Disp: 90 tablet, Rfl: 3    Pramipexole Dihydrochloride ER 1.5 MG tablet sustained-release 24 hour, Take 1 tablet by mouth Daily., Disp: 90 tablet, Rfl: 3    testosterone cypionate (Depo-Testosterone) 100 MG/ML solution injection, Inject 1 mL into the appropriate muscle as directed by prescriber Every 14 (Fourteen) Days. Please disp with syringes and needles., Disp: 1 mL, Rfl: 3    acetaminophen (TYLENOL) 650 MG 8 hr tablet, Take 1 tablet by mouth 3 (Three) Times a Day As Needed for Mild Pain. for pain, Disp: , Rfl:     cetirizine (zyrTEC) 10 MG tablet, Take 1 tablet by mouth Daily., Disp: , Rfl:     Semaglutide-Weight Management (Wegovy) 0.25 MG/0.5ML solution auto-injector, Inject 0.5 mL under the skin into the appropriate area as directed 1 (One) Time Per Week., Disp: 2 mL, Rfl: 1    Turmeric 500 MG capsule, Take 1 capsule by mouth Daily., Disp: , Rfl:     Allergies:   No Known Allergies    Objective     Physical Exam:  Vital Signs:   Vitals:    06/17/24 1514   BP: 138/78   Pulse: 77   SpO2: 98%   Weight: 123 kg (271 lb)   Height: 182.9 cm (72\")     Body mass index is 36.75 kg/m².     Physical Exam  Physical " Exam      Procedures    Results  Laboratory Studies  Sodium, potassium, chloride, and calcium levels were normal. Liver function was normal. Kidney function fluctuated over the past 2 to 3 years, with levels ranging from 1.5 to 1.48. Thyroid function was normal. Cholesterol levels were normal. Blood counts were normal.  Assessment / Plan      Assessment/Plan:   Diagnoses and all orders for this visit:    1. Morbid (severe) obesity due to excess calories (Primary)    2. Essential hypertension  -     amLODIPine (NORVASC) 10 MG tablet; Take 1 tablet by mouth Daily.  Dispense: 90 tablet; Refill: 3  -     metoprolol succinate XL (TOPROL-XL) 50 MG 24 hr tablet; Take 1 tablet by mouth Daily.  Dispense: 90 tablet; Refill: 3    3. Bipolar affective disorder, current episode mixed, current episode severity unspecified  -     DULoxetine (CYMBALTA) 60 MG capsule; Take 1 capsule by mouth Daily.  Dispense: 90 capsule; Refill: 3  -     lamoTRIgine (LaMICtal) 150 MG tablet; Take 1 tablet by mouth Daily.  Dispense: 90 tablet; Refill: 3    4. Restless leg syndrome  -     Discontinue: Pramipexole Dihydrochloride ER 1.5 MG tablet sustained-release 24 hour; Take 1 tablet by mouth Daily.  Dispense: 30 tablet; Refill: 0  -     Pramipexole Dihydrochloride ER 1.5 MG tablet sustained-release 24 hour; Take 1 tablet by mouth Daily.  Dispense: 90 tablet; Refill: 3    5. Testicular dysfunction  -     testosterone cypionate (Depo-Testosterone) 100 MG/ML solution injection; Inject 1 mL into the appropriate muscle as directed by prescriber Every 14 (Fourteen) Days. Please disp with syringes and needles.  Dispense: 1 mL; Refill: 3  -     Testosterone    Other orders  -     Semaglutide-Weight Management (Wegovy) 0.25 MG/0.5ML solution auto-injector; Inject 0.5 mL under the skin into the appropriate area as directed 1 (One) Time Per Week.  Dispense: 2 mL; Refill: 1       Assessment & Plan    The patient's electrolyte levels, including sodium,  potassium, chloride, and calcium, were within normal limits. Liver function was also normal, although a slight elevation in one of his liver enzymes was noted last summer. Kidney function has fluctuated over the past 2 to 3 years. Thyroid function, cholesterol, and blood counts were also normal. A prescription for Wegovy has been issued. Phentermine has been discontinued due to its side effects. Prescriptions for pramipexole and Cymbalta have been refilled. Testosterone levels will be rechecked.        Follow Up:   No follow-ups on file.    Walter Arrington  [facility] [place]     Patient or patient representative verbalized consent for the use of Ambient Listening during the visit with  Walter Arrington MD for chart documentation. 6/17/2024  16:23 EDT

## 2024-06-18 LAB — TESTOST SERPL-MCNC: 315 NG/DL (ref 264–916)

## 2024-07-29 ENCOUNTER — PATIENT MESSAGE (OUTPATIENT)
Dept: FAMILY MEDICINE CLINIC | Facility: CLINIC | Age: 54
End: 2024-07-29
Payer: COMMERCIAL

## 2024-07-29 RX ORDER — PRAMIPEXOLE DIHYDROCHLORIDE 1 MG/1
TABLET ORAL
Qty: 60 TABLET | Refills: 2 | Status: SHIPPED | OUTPATIENT
Start: 2024-07-29

## 2024-07-30 NOTE — TELEPHONE ENCOUNTER
From: Damon Blum  To: Walter Arrington  Sent: 7/29/2024 11:40 AM EDT  Subject: Pramipexole     Hi Doctor Murphy. I'm having a lot of trouble getting the Extended Release version of Pramipexole. My insurance doesn't allow the medicine. I tried getting it at [facility] but it's nearly $70 for one liph. Should we consider the regular version that's very cheap?

## 2024-09-26 ENCOUNTER — PATIENT MESSAGE (OUTPATIENT)
Dept: FAMILY MEDICINE CLINIC | Facility: CLINIC | Age: 54
End: 2024-09-26
Payer: COMMERCIAL

## 2024-11-07 ENCOUNTER — TELEPHONE (OUTPATIENT)
Dept: FAMILY MEDICINE CLINIC | Facility: CLINIC | Age: 54
End: 2024-11-07

## 2024-11-07 NOTE — TELEPHONE ENCOUNTER
Caller: CED WITH [employer]    Relationship:     Best call back number: [phone]     What form or medical record are you requesting: CHART NOTES FOR DATE OF SERVICE 6/17 FOR CPAP     How would you like to receive the form or medical records (pick-up, mail, fax):FAX: [phone] (ATTENTION CED)

## 2024-11-08 ENCOUNTER — PATIENT MESSAGE (OUTPATIENT)
Dept: FAMILY MEDICINE CLINIC | Facility: CLINIC | Age: 54
End: 2024-11-08
Payer: COMMERCIAL

## 2024-11-10 RX ORDER — PRAMIPEXOLE DIHYDROCHLORIDE 1 MG/1
TABLET ORAL
Qty: 180 TABLET | Refills: 2 | Status: SHIPPED | OUTPATIENT
Start: 2024-11-10

## 2025-04-19 DIAGNOSIS — I10 ESSENTIAL HYPERTENSION: ICD-10-CM

## 2025-04-19 DIAGNOSIS — F31.60 BIPOLAR AFFECTIVE DISORDER, CURRENT EPISODE MIXED, CURRENT EPISODE SEVERITY UNSPECIFIED: ICD-10-CM

## 2025-04-22 RX ORDER — AMLODIPINE BESYLATE 10 MG/1
10 TABLET ORAL DAILY
Qty: 90 TABLET | Refills: 3 | OUTPATIENT
Start: 2025-04-22

## 2025-04-22 RX ORDER — LAMOTRIGINE 150 MG/1
150 TABLET ORAL DAILY
Qty: 90 TABLET | Refills: 3 | OUTPATIENT
Start: 2025-04-22

## 2025-04-22 RX ORDER — METOPROLOL SUCCINATE 50 MG/1
50 TABLET, EXTENDED RELEASE ORAL DAILY
Qty: 90 TABLET | Refills: 3 | OUTPATIENT
Start: 2025-04-22

## 2025-04-22 RX ORDER — DULOXETIN HYDROCHLORIDE 60 MG/1
60 CAPSULE, DELAYED RELEASE ORAL DAILY
Qty: 90 CAPSULE | Refills: 3 | OUTPATIENT
Start: 2025-04-22

## 2025-06-24 ENCOUNTER — OFFICE VISIT (OUTPATIENT)
Dept: FAMILY MEDICINE CLINIC | Facility: CLINIC | Age: 55
End: 2025-06-24
Payer: COMMERCIAL

## 2025-06-24 VITALS
HEIGHT: 72 IN | WEIGHT: 285 LBS | DIASTOLIC BLOOD PRESSURE: 86 MMHG | BODY MASS INDEX: 38.6 KG/M2 | SYSTOLIC BLOOD PRESSURE: 130 MMHG
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
## 2025-06-24 DIAGNOSIS — S61.259A DOG BITE OF FINGER, INITIAL ENCOUNTER: Primary | ICD-10-CM

## 2025-06-24 PROCEDURE — 99213 OFFICE O/P EST LOW 20 MIN: CPT | Performed by: PHYSICIAN ASSISTANT

## 2025-06-24 NOTE — PROGRESS NOTES
".Chief Complaint  Animal Bite (Pt states happened yesterday afternoon)    Subjective          History of Present Illness  Damon Blum is here today with his  History of Present Illness  The patient presents for evaluation of a dog bite.    He sustained the injury yesterday around noon while attempting to intervene in an altercation between his brother's bulldog and his wife's smaller dog. The patient is concerned about the potential for infection, as the wound appears inflamed. He has been applying Neosporin to the affected area.Both dogs involved are up to date on their shots    Objective   Vital Signs:   /86 (BP Location: Left arm)   Ht 182.9 cm (72\")   Wt 129 kg (285 lb)   BMI 38.65 kg/m²     Body mass index is 38.65 kg/m².        Review of Systems      Current Outpatient Medications:   •  acetaminophen (TYLENOL) 650 MG 8 hr tablet, Take 1 tablet by mouth 3 (Three) Times a Day As Needed for Mild Pain. for pain, Disp: , Rfl:   •  amLODIPine (NORVASC) 10 MG tablet, Take 1 tablet by mouth Daily., Disp: 90 tablet, Rfl: 3  •  cetirizine (zyrTEC) 10 MG tablet, Take 1 tablet by mouth Daily., Disp: , Rfl:   •  DULoxetine (CYMBALTA) 60 MG capsule, Take 1 capsule by mouth Daily., Disp: 90 capsule, Rfl: 3  •  lamoTRIgine (LaMICtal) 150 MG tablet, Take 1 tablet by mouth Daily., Disp: 90 tablet, Rfl: 3  •  metoprolol succinate XL (TOPROL-XL) 50 MG 24 hr tablet, Take 1 tablet by mouth Daily., Disp: 90 tablet, Rfl: 3  •  pramipexole (MIRAPEX) 1 MG tablet, 1 to 2 pills p.o. nightly, Disp: 180 tablet, Rfl: 2  •  Semaglutide-Weight Management (Wegovy) 0.25 MG/0.5ML solution auto-injector, Inject 0.5 mL under the skin into the appropriate area as directed 1 (One) Time Per Week., Disp: 2 mL, Rfl: 1  •  testosterone cypionate (Depo-Testosterone) 100 MG/ML solution injection, Inject 1 mL into the appropriate muscle as directed by prescriber Every 14 (Fourteen) Days. Please disp with syringes and needles., Disp: 1 mL, " Rfl: 3  •  Turmeric 500 MG capsule, Take 1 capsule by mouth Daily., Disp: , Rfl:   •  amoxicillin-clavulanate (AUGMENTIN) 875-125 MG per tablet, Take 1 tablet by mouth 2 (Two) Times a Day for 10 days., Disp: 20 tablet, Rfl: 0    Allergies: Patient has no known allergies.    Physical Exam  Musculoskeletal:        Hands:       Comments: Slightly eryth - open with no drainage      Physical Exam      Result Review :          Results               Assessment and Plan    Diagnoses and all orders for this visit:    1. Dog bite of finger, initial encounter (Primary)    Other orders  -     amoxicillin-clavulanate (AUGMENTIN) 875-125 MG per tablet; Take 1 tablet by mouth 2 (Two) Times a Day for 10 days.  Dispense: 20 tablet; Refill: 0      Assessment & Plan    - Bitten by a bulldog yesterday afternoon while intervening in a dog fight. The wound is inflamed, raising concerns about infection.  - Physical exam reveals an inflamed wound. Neosporin has been applied.  - Discussed the importance of keeping the wound open and applying Neosporin. Reviewed the necessity of antibiotics due to potential bacterial infection from the dog bite.  - Prescription for Augmentin sent to pharmacy. Advised to consume yogurt or take probiotics to mitigate potential gastrointestinal side effects from the antibiotic.      Follow Up   No follow-ups on file.  Patient was given instructions and counseling regarding his condition or for health maintenance advice. Please see specific information pulled into the AVS if appropriate.     Patient or patient representative verbalized consent for the use of Ambient Listening during the visit with  RJ Lomax for chart documentation. 6/24/2025  14:05 EDT    RJ Lomax  06/24/2025

## 2025-07-16 ENCOUNTER — OFFICE VISIT (OUTPATIENT)
Dept: FAMILY MEDICINE CLINIC | Facility: CLINIC | Age: 55
End: 2025-07-16
Payer: COMMERCIAL

## 2025-07-16 VITALS
SYSTOLIC BLOOD PRESSURE: 134 MMHG | OXYGEN SATURATION: 96 % | HEART RATE: 92 BPM | HEIGHT: 72 IN | BODY MASS INDEX: 39.14 KG/M2 | DIASTOLIC BLOOD PRESSURE: 82 MMHG | WEIGHT: 289 LBS

## 2025-07-16 DIAGNOSIS — I10 ESSENTIAL HYPERTENSION: ICD-10-CM

## 2025-07-16 DIAGNOSIS — Z00.00 ANNUAL PHYSICAL EXAM: Primary | ICD-10-CM

## 2025-07-16 DIAGNOSIS — Z13.21 ENCOUNTER FOR VITAMIN DEFICIENCY SCREENING: ICD-10-CM

## 2025-07-16 DIAGNOSIS — Z13.29 SCREENING FOR THYROID DISORDER: ICD-10-CM

## 2025-07-16 DIAGNOSIS — F31.60 BIPOLAR AFFECTIVE DISORDER, CURRENT EPISODE MIXED, CURRENT EPISODE SEVERITY UNSPECIFIED: ICD-10-CM

## 2025-07-16 DIAGNOSIS — Z12.5 PROSTATE CANCER SCREENING: ICD-10-CM

## 2025-07-16 DIAGNOSIS — Z23 ENCOUNTER FOR IMMUNIZATION: ICD-10-CM

## 2025-07-16 DIAGNOSIS — Z13.1 SCREENING FOR DIABETES MELLITUS: ICD-10-CM

## 2025-07-16 DIAGNOSIS — Z13.220 SCREENING FOR LIPID DISORDERS: ICD-10-CM

## 2025-07-16 DIAGNOSIS — G25.81 RESTLESS LEG SYNDROME: ICD-10-CM

## 2025-07-16 DIAGNOSIS — E29.9 TESTICULAR DYSFUNCTION: ICD-10-CM

## 2025-07-16 DIAGNOSIS — G47.33 OBSTRUCTIVE SLEEP APNEA: ICD-10-CM

## 2025-07-16 PROBLEM — E87.6 HYPOKALEMIA: Status: ACTIVE | Noted: 2019-02-05

## 2025-07-16 PROBLEM — Z96.649 HISTORY OF TOTAL HIP ARTHROPLASTY: Status: ACTIVE | Noted: 2019-02-05

## 2025-07-16 PROBLEM — N18.9 CHRONIC RENAL INSUFFICIENCY: Status: ACTIVE | Noted: 2019-02-05

## 2025-07-16 PROBLEM — M19.90 ARTHRITIS: Status: ACTIVE | Noted: 2025-07-16

## 2025-07-16 PROBLEM — N42.9 DISORDER OF PROSTATE: Status: ACTIVE | Noted: 2025-07-16

## 2025-07-16 PROBLEM — N28.9 KIDNEY DISEASE: Status: ACTIVE | Noted: 2025-07-16

## 2025-07-16 NOTE — PROGRESS NOTES
"Chief Complaint  Establish Care (Former pt of Dr Arrington) and Annual Exam    Subjective          Damon Blum presents to [facility] for   History of Present Illness  .  History of Present Illness  The patient presents for an annual physical exam and to establish care with a new provider.    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 The growth was removed, and he believes this may have resolved his testosterone issues.    Medication for restless leg syndrome appears to be effective, although he now experiences involuntary muscle movements throughout his body.    His allergies have been manageable recently, although he notes increased ear congestion. He uses Flonase nasal spray as needed, which provides relief.      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        Family History   Problem Relation Age of Onset   • Cancer Mother         Lung cancer due to smoking cigarettes.   • Mental illness Mother    • Lung cancer Mother    • Depression Mother    • Arthritis Father    • Hypertension Father    • Breast cancer Sister    • Mental illness Brother    • Arthritis Maternal Grandmother    • Hypertension Maternal Grandmother    • Hyperlipidemia Maternal Grandmother    • Mental illness Maternal Grandfather    • Arthritis Paternal Grandmother    • Hypertension Paternal Grandmother    • Alzheimer's disease Paternal Grandfather    • Asthma Son        Social History     Tobacco Use   • Smoking status: Never   • Smokeless tobacco: Never   Substance Use Topics   • Alcohol use: Not Currently     Comment: occa       Past Surgical " History:   Procedure Laterality Date   • COLONOSCOPY  12/15/2022   • HERNIA REPAIR  2023   • HIP SURGERY Right    • JOINT REPLACEMENT  02/10/2020   • SHOULDER SURGERY Right    • VASECTOMY  10/15/2000       Review of Systems      Physical Exam  Vitals and nursing note reviewed.   Constitutional:       General: He is not in acute distress.     Appearance: Normal appearance. He is not ill-appearing.   HENT:      Head: Normocephalic and atraumatic.      Right Ear: Tympanic membrane and ear canal normal.      Left Ear: Tympanic membrane and ear canal normal.      Nose: Nose normal.      Mouth/Throat:      Mouth: Mucous membranes are moist.   Eyes:      Pupils: Pupils are equal, round, and reactive to light.   Cardiovascular:      Rate and Rhythm: Normal rate and regular rhythm.      Heart sounds: Normal heart sounds.   Pulmonary:      Effort: Pulmonary effort is normal.      Breath sounds: Normal breath sounds.   Skin:     General: Skin is warm.   Neurological:      Mental Status: He is alert. Mental status is at baseline.   Psychiatric:         Mood and Affect: Mood normal.      Result Review :                Immunization History   Administered Date(s) Administered   • Fluzone (or Fluarix & Flulaval for VFC) >6mos 11/05/2020, 11/07/2022   • Influenza, Unspecified 10/18/2021, 11/07/2022   • PCV21 (CAPVAXIVE) 07/16/2025   • Shingrix 03/08/2024, 06/17/2024   • Tdap 09/17/2013, 03/08/2024       Health Maintenance   Topic Date Due   • ANNUAL PHYSICAL  03/08/2025   • COVID-19 Vaccine (1 - 2024-25 season) 07/30/2025 (Originally 9/1/2024)   • INFLUENZA VACCINE  10/01/2025   • COLORECTAL CANCER SCREENING  01/01/2031   • TDAP/TD VACCINES (3 - Td or Tdap) 03/08/2034   • HEPATITIS C SCREENING  Completed   • Pneumococcal Vaccine 50+  Completed   • ZOSTER VACCINE  Completed              Assessment and Plan    Diagnoses and all orders for this visit:    1. Annual physical exam (Primary)  -     CBC & Differential  -     Comprehensive  Metabolic Panel  -     Hemoglobin A1c  -     Lipid Panel  -     PSA Screen  -     TSH  -     T4, free  -     Vitamin B12  -     Vitamin D,25-Hydroxy    - Blood pressure readings are within the normal range.  - Weight has remained stable over the past month.  - Pneumococcal vaccine will be administered today.  - Routine laboratory tests will be conducted, including a testosterone level check. He is advised to communicate any medication refill needs via [employer].  2. Essential hypertension  -     CBC & Differential  -     Comprehensive Metabolic Panel  -     Lipid Panel    3. Testicular dysfunction    4. Prostate cancer screening  -     PSA Screen    5. Bipolar affective disorder, current episode mixed, current episode severity unspecified    6. Screening for diabetes mellitus  -     Hemoglobin A1c    7. Screening for thyroid disorder  -     TSH  -     T4, free    8. Encounter for vitamin deficiency screening  -     Vitamin B12  -     Vitamin D,25-Hydroxy    9. Screening for lipid disorders  -     Lipid Panel    10. Obstructive sleep apnea    11. Restless leg syndrome    12. Encounter for immunization    -     Pneumococcal Conjugate Vaccine 21 (18+ yrs)      .  Assessment & Plan              Counseling/anticipatory guidance: Nutrition, physical activity, healthy weight, dental health, mental health, eye exam, immunizations, screenings      Follow Up   No follow-ups on file.  Patient was given instructions and counseling regarding his condition or for health maintenance advice. Please see specific information pulled into the AVS if appropriate.

## 2025-07-16 NOTE — LETTER
[facility]  Vaccine Consent Form    Patient Name:  Damon Blum  Patient :  1970     Vaccine(s) Ordered    Pneumococcal Conjugate Vaccine 21 (18+ yrs)        Screening Checklist  The following questions should be completed prior to vaccination. If you answer “yes” to any question, it does not necessarily mean you should not be vaccinated. It just means we may need to clarify or ask more questions. If a question is unclear, please ask your healthcare provider to explain it.    Yes No   Any fever or moderate to severe illness today (mild illness and/or antibiotic treatment are not contraindications)?     Do you have a history of a serious reaction to any previous vaccinations, such as anaphylaxis, encephalopathy within 7 days, Guillain-[place] syndrome within 6 weeks, seizure?     Have you received any live vaccine(s) (e.g MMR, JAYJAY) or any other vaccines in the last month (to ensure duplicate doses aren't given)?     Do you have an anaphylactic allergy to latex (DTaP, DTaP-IPV, Hep A, Hep B, MenB, RV, Td, Tdap), baker’s yeast (Hep B, HPV), polysorbates (RSV, nirsevimab, PCV 20 and 21, Rotavirrus, Tdap, Shingrix), or gelatin (JAYJAY, MMR)?     Do you have an anaphylactic allergy to neomycin (Rabies, JAYJAY, MMR, IPV, Hep A), polymyxin B (IPV), or streptomycin (IPV)?      Any cancer, leukemia, AIDS, or other immune system disorder? (JAYJAY, MMR, RV)     Do you have a parent, brother, or sister with an immune system problem (if immune competence of vaccine recipient clinically verified, can proceed)? (MMR, JAYJAY)     Any recent steroid treatments for >2 weeks, chemotherapy, or radiation treatment? (JAYJAY, MMR)     Have you received antibody-containing blood transfusions or IVIG in the past 11 months (recommended interval is dependent on product)? (MMR, JAYJAY)     Have you taken antiviral drugs (acyclovir, famciclovir, valacyclovir for JAYJAY) in the last 24 or 48 hours, respectively?      Are you pregnant or planning to  "become pregnant within 1 month? (JAYJAY, MMR, HPV, IPV, MenB, Abrexvy; For Hep B- refer to Engerix-B; For RSV - Abrysvo is indicated for 32-36 weeks of pregnancy from September to January)     For infants, have you ever been told your child has had intussusception or a medical emergency involving obstruction of the intestine (Rotavirus)? If not for an infant, can skip this question.         *Ordering Physicians/APC should be consulted if \"yes\" is checked by the patient or guardian above.  I have received, read, and understand the Vaccine Information Statement (VIS) for each vaccine ordered.  I have considered my or my child's health status as well as the health status of my close contacts.  I have taken the opportunity to discuss my vaccine questions with my or my child's health care provider.   I have requested that the ordered vaccine(s) be given to me or my child.  I understand the benefits and risks of the vaccines.  I understand that I should remain in the clinic for 15 minutes after receiving the vaccine(s).  _________________________________________________________  Signature of Patient or Parent/Legal Guardian ____________________  Date     "

## 2025-07-17 ENCOUNTER — RESULTS FOLLOW-UP (OUTPATIENT)
Dept: FAMILY MEDICINE CLINIC | Facility: CLINIC | Age: 55
End: 2025-07-17
Payer: COMMERCIAL

## 2025-07-17 DIAGNOSIS — E55.9 VITAMIN D DEFICIENCY: Primary | ICD-10-CM

## 2025-07-17 LAB
25(OH)D3+25(OH)D2 SERPL-MCNC: 24.7 NG/ML (ref 30–100)
ALBUMIN SERPL-MCNC: 4.2 G/DL (ref 3.8–4.9)
ALP SERPL-CCNC: 87 IU/L (ref 44–121)
ALT SERPL-CCNC: 32 IU/L (ref 0–44)
AST SERPL-CCNC: 23 IU/L (ref 0–40)
BASOPHILS # BLD AUTO: 0 X10E3/UL (ref 0–0.2)
BASOPHILS NFR BLD AUTO: 0 %
BILIRUB SERPL-MCNC: 0.4 MG/DL (ref 0–1.2)
BUN SERPL-MCNC: 24 MG/DL (ref 6–24)
BUN/CREAT SERPL: 17 (ref 9–20)
CALCIUM SERPL-MCNC: 9.4 MG/DL (ref 8.7–10.2)
CHLORIDE SERPL-SCNC: 104 MMOL/L (ref 96–106)
CHOLEST SERPL-MCNC: 189 MG/DL (ref 100–199)
CO2 SERPL-SCNC: 21 MMOL/L (ref 20–29)
CREAT SERPL-MCNC: 1.39 MG/DL (ref 0.76–1.27)
EGFRCR SERPLBLD CKD-EPI 2021: 59 ML/MIN/1.73
EOSINOPHIL # BLD AUTO: 0.4 X10E3/UL (ref 0–0.4)
EOSINOPHIL NFR BLD AUTO: 6 %
ERYTHROCYTE [DISTWIDTH] IN BLOOD BY AUTOMATED COUNT: 13.3 % (ref 11.6–15.4)
GLOBULIN SER CALC-MCNC: 2.4 G/DL (ref 1.5–4.5)
GLUCOSE SERPL-MCNC: 91 MG/DL (ref 70–99)
HBA1C MFR BLD: 5.9 % (ref 4.8–5.6)
HCT VFR BLD AUTO: 46.9 % (ref 37.5–51)
HDLC SERPL-MCNC: 54 MG/DL
HGB BLD-MCNC: 15.2 G/DL (ref 13–17.7)
IMM GRANULOCYTES # BLD AUTO: 0 X10E3/UL (ref 0–0.1)
IMM GRANULOCYTES NFR BLD AUTO: 0 %
LDLC SERPL CALC-MCNC: 112 MG/DL (ref 0–99)
LYMPHOCYTES # BLD AUTO: 1.5 X10E3/UL (ref 0.7–3.1)
LYMPHOCYTES NFR BLD AUTO: 25 %
MCH RBC QN AUTO: 28.6 PG (ref 26.6–33)
MCHC RBC AUTO-ENTMCNC: 32.4 G/DL (ref 31.5–35.7)
MCV RBC AUTO: 88 FL (ref 79–97)
MONOCYTES # BLD AUTO: 0.5 X10E3/UL (ref 0.1–0.9)
MONOCYTES NFR BLD AUTO: 8 %
NEUTROPHILS # BLD AUTO: 3.6 X10E3/UL (ref 1.4–7)
NEUTROPHILS NFR BLD AUTO: 61 %
PLATELET # BLD AUTO: 171 X10E3/UL (ref 150–450)
POTASSIUM SERPL-SCNC: 4.2 MMOL/L (ref 3.5–5.2)
PROT SERPL-MCNC: 6.6 G/DL (ref 6–8.5)
PSA SERPL-MCNC: 0.6 NG/ML (ref 0–4)
RBC # BLD AUTO: 5.32 X10E6/UL (ref 4.14–5.8)
SODIUM SERPL-SCNC: 139 MMOL/L (ref 134–144)
T4 FREE SERPL-MCNC: 0.94 NG/DL (ref 0.82–1.77)
TRIGL SERPL-MCNC: 132 MG/DL (ref 0–149)
TSH SERPL DL<=0.005 MIU/L-ACNC: 1.12 UIU/ML (ref 0.45–4.5)
VIT B12 SERPL-MCNC: 554 PG/ML (ref 232–1245)
VLDLC SERPL CALC-MCNC: 23 MG/DL (ref 5–40)
WBC # BLD AUTO: 5.9 X10E3/UL (ref 3.4–10.8)

## 2025-07-17 RX ORDER — ERGOCALCIFEROL 1.25 MG/1
50000 CAPSULE, LIQUID FILLED ORAL WEEKLY
Qty: 25 CAPSULE | Refills: 0 | Status: SHIPPED | OUTPATIENT
Start: 2025-07-17 | End: 2026-01-13

## 2025-07-17 NOTE — TELEPHONE ENCOUNTER
DIANA on  to call back.    HUB to relay message from Cassie    Please call the patient and let him know that his kidney function remains stable. His electrolytes are normal as are his liver function tests. His hemoglobin A1c is at 5.9 which is again in the prediabetic range which his elevated that test about a year ago would encourage him to continue to decrease the amount of sugar and carbs that he is eating and increasing lean meat his lipid panel looks good with a total cholesterol 189 and a triglyceride of 132 his LDL is slightly elevated at. His vitamin D is low at 24. I will after which I like to have him take a multi vitamin D his blood count is normal. His PSA is also very low normal. His thyroid panel is normal and his vitamin B12 is normal as well